# Patient Record
Sex: FEMALE | Race: WHITE | NOT HISPANIC OR LATINO | Employment: UNEMPLOYED | URBAN - METROPOLITAN AREA
[De-identification: names, ages, dates, MRNs, and addresses within clinical notes are randomized per-mention and may not be internally consistent; named-entity substitution may affect disease eponyms.]

---

## 2023-06-29 LAB — URINE CULTURE: NORMAL

## 2023-07-10 LAB
ABO GROUP (TYPE) IN BLOOD: NORMAL
ANTIBODY SCREEN: NORMAL
RH FACTOR: NORMAL

## 2023-11-03 PROBLEM — I10 CHRONIC HYPERTENSION: Status: ACTIVE | Noted: 2023-11-03

## 2023-11-03 PROBLEM — N91.2 AMENORRHEA: Status: ACTIVE | Noted: 2023-11-03

## 2023-11-03 PROBLEM — O13.9 PREGNANCY-INDUCED HYPERTENSION (HHS-HCC): Status: ACTIVE | Noted: 2023-11-03

## 2023-11-03 PROBLEM — O03.9 COMPLETE ABORTION (HHS-HCC): Status: ACTIVE | Noted: 2023-11-03

## 2023-11-03 PROBLEM — O03.9 MISCARRIAGE (HHS-HCC): Status: ACTIVE | Noted: 2023-11-03

## 2023-11-03 PROBLEM — E66.01 OBESITY, MORBID, BMI 40.0-49.9 (MULTI): Status: ACTIVE | Noted: 2023-11-03

## 2023-11-03 RX ORDER — PRENATAL VIT,CAL 74/IRON/FOLIC 27 MG-1 MG
1 TABLET ORAL DAILY
COMMUNITY
Start: 2020-11-24 | End: 2024-05-16 | Stop reason: HOSPADM

## 2023-11-03 RX ORDER — LABETALOL 200 MG/1
1 TABLET, FILM COATED ORAL 2 TIMES DAILY
COMMUNITY
Start: 2021-06-14 | End: 2023-11-06 | Stop reason: WASHOUT

## 2023-11-03 RX ORDER — MISOPROSTOL 200 UG/1
TABLET ORAL
COMMUNITY
Start: 2023-02-15 | End: 2023-11-06 | Stop reason: WASHOUT

## 2023-11-03 RX ORDER — SYRINGE WITH NEEDLE, INSULIN
SYRINGE, EMPTY DISPOSABLE MISCELLANEOUS
COMMUNITY
End: 2023-11-06 | Stop reason: WASHOUT

## 2023-11-06 ENCOUNTER — INITIAL PRENATAL (OUTPATIENT)
Dept: OBSTETRICS AND GYNECOLOGY | Facility: CLINIC | Age: 28
End: 2023-11-06
Payer: COMMERCIAL

## 2023-11-06 VITALS — BODY MASS INDEX: 47.63 KG/M2 | DIASTOLIC BLOOD PRESSURE: 84 MMHG | SYSTOLIC BLOOD PRESSURE: 130 MMHG | WEIGHT: 293 LBS

## 2023-11-06 DIAGNOSIS — Z34.80 SUPERVISION OF OTHER NORMAL PREGNANCY, ANTEPARTUM (HHS-HCC): ICD-10-CM

## 2023-11-06 DIAGNOSIS — Z34.90 PRENATAL CARE, ANTEPARTUM (HHS-HCC): Primary | ICD-10-CM

## 2023-11-06 DIAGNOSIS — I10 CHRONIC HYPERTENSION: ICD-10-CM

## 2023-11-06 DIAGNOSIS — E66.01 OBESITY, MORBID, BMI 40.0-49.9 (MULTI): ICD-10-CM

## 2023-11-06 PROBLEM — N91.2 AMENORRHEA: Status: RESOLVED | Noted: 2023-11-03 | Resolved: 2023-11-06

## 2023-11-06 PROBLEM — O03.9 MISCARRIAGE (HHS-HCC): Status: RESOLVED | Noted: 2023-11-03 | Resolved: 2023-11-06

## 2023-11-06 PROBLEM — O03.9 COMPLETE ABORTION (HHS-HCC): Status: RESOLVED | Noted: 2023-11-03 | Resolved: 2023-11-06

## 2023-11-06 PROBLEM — O13.9 PREGNANCY-INDUCED HYPERTENSION (HHS-HCC): Status: RESOLVED | Noted: 2023-11-03 | Resolved: 2023-11-06

## 2023-11-06 PROCEDURE — 90686 IIV4 VACC NO PRSV 0.5 ML IM: CPT | Performed by: OBSTETRICS & GYNECOLOGY

## 2023-11-06 PROCEDURE — 0500F INITIAL PRENATAL CARE VISIT: CPT | Performed by: OBSTETRICS & GYNECOLOGY

## 2023-11-06 PROCEDURE — 90471 IMMUNIZATION ADMIN: CPT | Performed by: OBSTETRICS & GYNECOLOGY

## 2023-11-06 PROCEDURE — 87800 DETECT AGNT MULT DNA DIREC: CPT

## 2023-11-06 PROCEDURE — 87086 URINE CULTURE/COLONY COUNT: CPT

## 2023-11-07 LAB
BACTERIA UR CULT: NORMAL
C TRACH RRNA SPEC QL NAA+PROBE: NEGATIVE
N GONORRHOEA DNA SPEC QL PROBE+SIG AMP: NEGATIVE

## 2023-11-07 NOTE — PROGRESS NOTES
Subjective   Patient ID [id number]   Melania You is a 27 y.o.  at 8w4d with a working estimated date of delivery of 2024, by Last Menstrual Period who presents for an initial prenatal visit. This pregnancy is planned. She is taking prenatal vitamins.  H/o 34 wk vaginal delivery after IOL at [facility] for siPEC.    Her pregnancy is complicated by:  cHTN: not on meds, multiple mild range bps outside of pregnancy in allscripts  H/o siPEC: delivered at 34.5 wks at [facility] after IOL, vaginal delivery c/b PPH (no transfusion)  H/o GDM  Obesity, starting BMI 47    OB History    Para Term  AB Living   4 1 0 1 2 1   SAB IAB Ectopic Multiple Live Births   2 0 0 0 1      # Outcome Date GA Lbr Jaspreet/2nd Weight Sex Delivery Anes PTL Lv   4 Current            3  21   2778 g M   Y MARIAM   2 SAB  7w0d          1 SAB  7w0d                 Objective   Physical Exam  Weight: 138 kg (304 lb 2 oz)  Expected Total Weight Gain: Could not be calculated   Pregravid BMI: Could not be calculated  BP: 130/84    OBGyn Exam  Gen in NAD  TVUS with single IUP with +FCA c/w LMP, ASHLEY 24    Prenatal Labs  Pending n/v    Assessment/Plan   28 y/o  at 8.4 wks by sure LMP c/w FTUS today presenting for new ob visit.   -cHTN, not on meds: plan serial growth, baseline labs  -h/o siPEC: plan baseline labs at n/v, start ASA at 12 wks  -obesity: NSTs at 34 wks, need to discuss [facility] delivery if BMI >50  -h/o GDM: plan early A1c  -desires NIPS, will draw at n/v  -order given to schedule NT scan  -flu shot given today, discussed covid booster is strongly recommended, she will get  -RTC 4 wks, OB labs, NIPS, baseline HELLP labs, A1c to be drawn then, start ASA    Marta Carmona MD

## 2023-11-17 ENCOUNTER — TELEPHONE (OUTPATIENT)
Dept: OBSTETRICS AND GYNECOLOGY | Facility: CLINIC | Age: 28
End: 2023-11-17

## 2023-11-17 NOTE — TELEPHONE ENCOUNTER
10.1 wk ob called ob line c/o pink spotting.  Patient denies cramping or pain.  Patient admits to having intercourse Wednesday and Thursday night, spotting started this morning.  Patient assured that spotting after intercourse is not uncommon.  Spotting can occur during, directly after or even 1-2 days following intercourse and can last an hour or a few days.  Patient advised to monitor spotting for now, it should eventually turn brown and stop all together.  Patient advised to call if she starts bleeding like a period or with intense cramping or pain.  Patient voiced understanding, no further questions

## 2023-12-01 ENCOUNTER — ROUTINE PRENATAL (OUTPATIENT)
Dept: OBSTETRICS AND GYNECOLOGY | Facility: CLINIC | Age: 28
End: 2023-12-01
Payer: COMMERCIAL

## 2023-12-01 VITALS — BODY MASS INDEX: 48.75 KG/M2 | DIASTOLIC BLOOD PRESSURE: 78 MMHG | WEIGHT: 293 LBS | SYSTOLIC BLOOD PRESSURE: 130 MMHG

## 2023-12-01 DIAGNOSIS — Z3A.12 12 WEEKS GESTATION OF PREGNANCY (HHS-HCC): Primary | ICD-10-CM

## 2023-12-01 DIAGNOSIS — E66.01 OBESITY, MORBID, BMI 40.0-49.9 (MULTI): ICD-10-CM

## 2023-12-01 DIAGNOSIS — I10 CHRONIC HYPERTENSION: ICD-10-CM

## 2023-12-01 LAB
ABO GROUP (TYPE) IN BLOOD: NORMAL
ALBUMIN SERPL BCP-MCNC: 4.1 G/DL (ref 3.4–5)
ALP SERPL-CCNC: 50 U/L (ref 33–110)
ALT SERPL W P-5'-P-CCNC: 18 U/L (ref 7–45)
ANION GAP SERPL CALC-SCNC: 14 MMOL/L (ref 10–20)
ANTIBODY SCREEN: NORMAL
AST SERPL W P-5'-P-CCNC: 10 U/L (ref 9–39)
BILIRUB SERPL-MCNC: 0.3 MG/DL (ref 0–1.2)
BUN SERPL-MCNC: 10 MG/DL (ref 6–23)
CALCIUM SERPL-MCNC: 8.9 MG/DL (ref 8.6–10.3)
CHLORIDE SERPL-SCNC: 104 MMOL/L (ref 98–107)
CO2 SERPL-SCNC: 22 MMOL/L (ref 21–32)
CREAT SERPL-MCNC: 0.43 MG/DL (ref 0.5–1.05)
CREAT UR-MCNC: 30.2 MG/DL (ref 20–320)
ERYTHROCYTE [DISTWIDTH] IN BLOOD BY AUTOMATED COUNT: 13.1 % (ref 11.5–14.5)
GFR SERPL CREATININE-BSD FRML MDRD: >90 ML/MIN/1.73M*2
GLUCOSE SERPL-MCNC: 90 MG/DL (ref 74–99)
HBV SURFACE AG SERPL QL IA: NONREACTIVE
HCT VFR BLD AUTO: 38.9 % (ref 36–46)
HCV AB SER QL: NONREACTIVE
HGB BLD-MCNC: 13.4 G/DL (ref 12–16)
HIV 1+2 AB+HIV1 P24 AG SERPL QL IA: NONREACTIVE
LDH SERPL L TO P-CCNC: 169 U/L (ref 84–246)
MCH RBC QN AUTO: 31.3 PG (ref 26–34)
MCHC RBC AUTO-ENTMCNC: 34.4 G/DL (ref 32–36)
MCV RBC AUTO: 91 FL (ref 80–100)
NRBC BLD-RTO: 0 /100 WBCS (ref 0–0)
PLATELET # BLD AUTO: 295 X10*3/UL (ref 150–450)
POTASSIUM SERPL-SCNC: 3.7 MMOL/L (ref 3.5–5.3)
PROT SERPL-MCNC: 6.9 G/DL (ref 6.4–8.2)
PROT UR-ACNC: 5 MG/DL (ref 5–24)
PROT/CREAT UR: 0.17 MG/MG CREAT (ref 0–0.17)
RBC # BLD AUTO: 4.28 X10*6/UL (ref 4–5.2)
RH FACTOR (ANTIGEN D): NORMAL
SODIUM SERPL-SCNC: 136 MMOL/L (ref 136–145)
T PALLIDUM AB SER QL: NONREACTIVE
WBC # BLD AUTO: 11.6 X10*3/UL (ref 4.4–11.3)

## 2023-12-01 PROCEDURE — 82570 ASSAY OF URINE CREATININE: CPT

## 2023-12-01 PROCEDURE — 83615 LACTATE (LD) (LDH) ENZYME: CPT

## 2023-12-01 PROCEDURE — 0501F PRENATAL FLOW SHEET: CPT

## 2023-12-01 PROCEDURE — 86901 BLOOD TYPING SEROLOGIC RH(D): CPT

## 2023-12-01 PROCEDURE — 83036 HEMOGLOBIN GLYCOSYLATED A1C: CPT

## 2023-12-01 PROCEDURE — 88175 CYTOPATH C/V AUTO FLUID REDO: CPT

## 2023-12-01 PROCEDURE — 86317 IMMUNOASSAY INFECTIOUS AGENT: CPT

## 2023-12-01 PROCEDURE — 86900 BLOOD TYPING SEROLOGIC ABO: CPT

## 2023-12-01 PROCEDURE — 36415 COLL VENOUS BLD VENIPUNCTURE: CPT

## 2023-12-01 PROCEDURE — 80053 COMPREHEN METABOLIC PANEL: CPT

## 2023-12-01 PROCEDURE — 87624 HPV HI-RISK TYP POOLED RSLT: CPT

## 2023-12-01 PROCEDURE — 86803 HEPATITIS C AB TEST: CPT

## 2023-12-01 PROCEDURE — 85027 COMPLETE CBC AUTOMATED: CPT

## 2023-12-01 PROCEDURE — 87340 HEPATITIS B SURFACE AG IA: CPT

## 2023-12-01 PROCEDURE — 86780 TREPONEMA PALLIDUM: CPT

## 2023-12-01 PROCEDURE — 87389 HIV-1 AG W/HIV-1&-2 AB AG IA: CPT

## 2023-12-01 PROCEDURE — 84156 ASSAY OF PROTEIN URINE: CPT

## 2023-12-01 PROCEDURE — 86850 RBC ANTIBODY SCREEN: CPT

## 2023-12-01 NOTE — PROGRESS NOTES
Subjective   Melania You is a 28 y.o.  at 12w1d with a working estimated date of delivery of 2024, by Last Menstrual Period who presents for a routine prenatal visit. She denies vaginal bleeding. Patient reports that nausea is improving. Patient reports good compliance with PNV though has not started taking ASA.     Her pregnancy is complicated by:  Medical Problems       Problem List       Chronic hypertension    Overview Addendum 2023  9:36 PM by Marta Carmona MD     -mild range bps outside of pregnancy reviewed in allscripts         Obesity, morbid, BMI 40.0-49.9 (CMS/[facility])    Overview Signed 2023  9:26 PM by Marta Carmona MD     -starting BMI 47  -need to [place] [facility] [place] >50         Pre-eclampsia, severe, delivered    Overview Addendum 2023  9:29 PM by Marta Carmona MD     -IOL at 34.5 wks for siPEC [place] [facility]  -start ASA at 12 wks, check baseline labs at n/v         Supervision of other normal pregnancy, antepartum    Overview Signed 2023  9:27 PM by Marta Carmona MD     -s/p flu vaccine   -will get COVID booster  -NIPS at n/v along with prenatal labs, A1c, baseline preeclampsia labs               Objective   Physical Exam  Weight: 141 kg (311 lb 4 oz), Pregravid BMI: 47.62  TWG: 3.232 kg (7 lb 2 oz)   Expected Total Weight Gain: 5 kg (11 lb)-9 kg (19 lb)   BP: 130/78           Lab Results   Component Value Date    HGB 13.6 2023    HCT 41.6 2023     2023    ABO CANCELED 2023    LABRH CANCELED 2023    ABID ANTI-D ACQUIRED 2021    NEISSGONOAMP Negative 2023    CHLAMTRACAMP Negative 2023    SYPHT NONREACTIVE 2021    HEPBSAG NONREACTIVE 2020    HIV1X2 NONREACTIVE 2020    URINECULTURE No significant growth 2023           Assessment/Plan   All new OB labs, Pre-e, hgba1c, and NIPS today- patient does want to know gender  Begin ASA prophylaxis today  Patient will obtain COVID booster  prior to next visit  Review NT scan 12/6/23  PE and pap today, no hx of abnormals if current pap is normal repeat in 3 years  Reviewed warning signs and when to call provider  Follow up in 4 weeks for a routine prenatal visit.    KEELY Austin-MELINA

## 2023-12-02 LAB
EST. AVERAGE GLUCOSE BLD GHB EST-MCNC: 108 MG/DL
HBA1C MFR BLD: 5.4 %
REFLEX ADDED, ANEMIA PANEL: NORMAL
RUBV IGG SERPL IA-ACNC: 1.4 IA
RUBV IGG SERPL QL IA: POSITIVE

## 2023-12-06 ENCOUNTER — ANCILLARY PROCEDURE (OUTPATIENT)
Dept: RADIOLOGY | Facility: CLINIC | Age: 28
End: 2023-12-06
Payer: COMMERCIAL

## 2023-12-06 DIAGNOSIS — Z34.90 PRENATAL CARE, ANTEPARTUM (HHS-HCC): ICD-10-CM

## 2023-12-06 DIAGNOSIS — O99.210 OBESITY AFFECTING PREGNANCY (HHS-HCC): ICD-10-CM

## 2023-12-06 PROCEDURE — 76813 OB US NUCHAL MEAS 1 GEST: CPT

## 2023-12-06 PROCEDURE — 76813 OB US NUCHAL MEAS 1 GEST: CPT | Performed by: OBSTETRICS & GYNECOLOGY

## 2023-12-06 PROCEDURE — 76801 OB US < 14 WKS SINGLE FETUS: CPT

## 2023-12-16 LAB
CYTOLOGY CMNT CVX/VAG CYTO-IMP: NORMAL
HPV HR GENOTYPES PNL CVX NAA+PROBE: NEGATIVE
LAB AP HPV GENOTYPE QUESTION: NO
LAB AP HPV HR: NORMAL
LABORATORY COMMENT REPORT: NORMAL
PATH REPORT.TOTAL CANCER: NORMAL

## 2024-01-02 ENCOUNTER — ROUTINE PRENATAL (OUTPATIENT)
Dept: OBSTETRICS AND GYNECOLOGY | Facility: CLINIC | Age: 29
End: 2024-01-02
Payer: COMMERCIAL

## 2024-01-02 VITALS — BODY MASS INDEX: 49.55 KG/M2 | SYSTOLIC BLOOD PRESSURE: 124 MMHG | DIASTOLIC BLOOD PRESSURE: 68 MMHG | WEIGHT: 293 LBS

## 2024-01-02 DIAGNOSIS — Z3A.16 16 WEEKS GESTATION OF PREGNANCY (HHS-HCC): Primary | ICD-10-CM

## 2024-01-02 DIAGNOSIS — Z34.80 SUPERVISION OF OTHER NORMAL PREGNANCY, ANTEPARTUM (HHS-HCC): ICD-10-CM

## 2024-01-02 PROCEDURE — 0501F PRENATAL FLOW SHEET: CPT | Performed by: OBSTETRICS & GYNECOLOGY

## 2024-01-02 NOTE — PROGRESS NOTES
Routine ob at 16.5 wks.  Feeling well. Risk reducing NIPS, its a girl and normal NT scan.  Has anatomy scan scheduled.  Normal ob labs and baseline HELLP labs as well as normal baseline A1c.  RTC 4 wks.

## 2024-01-31 ENCOUNTER — APPOINTMENT (OUTPATIENT)
Dept: OBSTETRICS AND GYNECOLOGY | Facility: CLINIC | Age: 29
End: 2024-01-31
Payer: COMMERCIAL

## 2024-02-01 ENCOUNTER — HOSPITAL ENCOUNTER (OUTPATIENT)
Dept: RADIOLOGY | Facility: CLINIC | Age: 29
Discharge: HOME | End: 2024-02-01
Payer: COMMERCIAL

## 2024-02-01 DIAGNOSIS — Z34.90 PRENATAL CARE, ANTEPARTUM (HHS-HCC): ICD-10-CM

## 2024-02-01 PROCEDURE — 76811 OB US DETAILED SNGL FETUS: CPT | Performed by: STUDENT IN AN ORGANIZED HEALTH CARE EDUCATION/TRAINING PROGRAM

## 2024-02-01 PROCEDURE — 76811 OB US DETAILED SNGL FETUS: CPT

## 2024-02-06 ENCOUNTER — ROUTINE PRENATAL (OUTPATIENT)
Dept: OBSTETRICS AND GYNECOLOGY | Facility: CLINIC | Age: 29
End: 2024-02-06
Payer: COMMERCIAL

## 2024-02-06 VITALS — SYSTOLIC BLOOD PRESSURE: 128 MMHG | BODY MASS INDEX: 49.98 KG/M2 | WEIGHT: 293 LBS | DIASTOLIC BLOOD PRESSURE: 88 MMHG

## 2024-02-06 DIAGNOSIS — Z34.80 SUPERVISION OF OTHER NORMAL PREGNANCY, ANTEPARTUM (HHS-HCC): ICD-10-CM

## 2024-02-06 DIAGNOSIS — Z3A.21 21 WEEKS GESTATION OF PREGNANCY (HHS-HCC): Primary | ICD-10-CM

## 2024-02-06 PROCEDURE — 0501F PRENATAL FLOW SHEET: CPT | Performed by: OBSTETRICS & GYNECOLOGY

## 2024-02-06 NOTE — PROGRESS NOTES
Routine ob at 21.5 wks.  Anatomy scan with missing views, has follow up views scheduled.   First bp mild range, repeat normal, patient with cHTN not on meds.  Good FM.  1 hour n/v.  RTC 4 wks.

## 2024-02-15 ENCOUNTER — HOSPITAL ENCOUNTER (OUTPATIENT)
Dept: RADIOLOGY | Facility: CLINIC | Age: 29
Discharge: HOME | End: 2024-02-15
Payer: COMMERCIAL

## 2024-02-15 DIAGNOSIS — Z34.90 PRENATAL CARE, ANTEPARTUM (HHS-HCC): ICD-10-CM

## 2024-02-15 PROCEDURE — 76816 OB US FOLLOW-UP PER FETUS: CPT

## 2024-02-15 PROCEDURE — 76816 OB US FOLLOW-UP PER FETUS: CPT | Performed by: OBSTETRICS & GYNECOLOGY

## 2024-03-05 ENCOUNTER — ROUTINE PRENATAL (OUTPATIENT)
Dept: OBSTETRICS AND GYNECOLOGY | Facility: CLINIC | Age: 29
End: 2024-03-05
Payer: COMMERCIAL

## 2024-03-05 VITALS — DIASTOLIC BLOOD PRESSURE: 82 MMHG | WEIGHT: 293 LBS | SYSTOLIC BLOOD PRESSURE: 122 MMHG | BODY MASS INDEX: 51.06 KG/M2

## 2024-03-05 DIAGNOSIS — Z3A.25 25 WEEKS GESTATION OF PREGNANCY (HHS-HCC): Primary | ICD-10-CM

## 2024-03-05 DIAGNOSIS — Z13.1 SCREENING FOR DIABETES MELLITUS: ICD-10-CM

## 2024-03-05 DIAGNOSIS — Z34.80 SUPERVISION OF OTHER NORMAL PREGNANCY, ANTEPARTUM (HHS-HCC): ICD-10-CM

## 2024-03-05 LAB
ABO GROUP (TYPE) IN BLOOD: NORMAL
ANTIBODY SCREEN: NORMAL
ERYTHROCYTE [DISTWIDTH] IN BLOOD BY AUTOMATED COUNT: 14 % (ref 11.5–14.5)
GLUCOSE 1H P 50 G GLC PO SERPL-MCNC: 133 MG/DL
HCT VFR BLD AUTO: 35.9 % (ref 36–46)
HGB BLD-MCNC: 11.8 G/DL (ref 12–16)
MCH RBC QN AUTO: 31.8 PG (ref 26–34)
MCHC RBC AUTO-ENTMCNC: 32.9 G/DL (ref 32–36)
MCV RBC AUTO: 97 FL (ref 80–100)
NRBC BLD-RTO: 0 /100 WBCS (ref 0–0)
PLATELET # BLD AUTO: 238 X10*3/UL (ref 150–450)
RBC # BLD AUTO: 3.71 X10*6/UL (ref 4–5.2)
REFLEX ADDED, ANEMIA PANEL: NORMAL
RH FACTOR (ANTIGEN D): NORMAL
WBC # BLD AUTO: 12.1 X10*3/UL (ref 4.4–11.3)

## 2024-03-05 PROCEDURE — 82947 ASSAY GLUCOSE BLOOD QUANT: CPT

## 2024-03-05 PROCEDURE — 86850 RBC ANTIBODY SCREEN: CPT

## 2024-03-05 PROCEDURE — 0501F PRENATAL FLOW SHEET: CPT | Performed by: OBSTETRICS & GYNECOLOGY

## 2024-03-05 PROCEDURE — 36415 COLL VENOUS BLD VENIPUNCTURE: CPT

## 2024-03-05 PROCEDURE — 86901 BLOOD TYPING SEROLOGIC RH(D): CPT

## 2024-03-05 PROCEDURE — 86900 BLOOD TYPING SEROLOGIC ABO: CPT

## 2024-03-05 PROCEDURE — 85027 COMPLETE CBC AUTOMATED: CPT

## 2024-03-05 NOTE — PROGRESS NOTES
Routine ob at 25.5 wks with cHTN not on meds.  Overall doing well.  Normal anatomy scan, has next growth scan scheduled.  BMI now >50, aware [place] will [place] at [facility]. 1 hour today.  RTC 2 wks for rhogam/tdap.

## 2024-03-19 ENCOUNTER — OFFICE VISIT (OUTPATIENT)
Dept: OBSTETRICS AND GYNECOLOGY | Facility: CLINIC | Age: 29
End: 2024-03-19
Payer: COMMERCIAL

## 2024-03-19 VITALS — SYSTOLIC BLOOD PRESSURE: 124 MMHG | WEIGHT: 293 LBS | DIASTOLIC BLOOD PRESSURE: 70 MMHG | BODY MASS INDEX: 50.51 KG/M2

## 2024-03-19 DIAGNOSIS — Z23 NEED FOR DIPHTHERIA-TETANUS-PERTUSSIS (TDAP) VACCINE: ICD-10-CM

## 2024-03-19 DIAGNOSIS — Z29.13 NEED FOR RHOGAM DUE TO RH NEGATIVE MOTHER: ICD-10-CM

## 2024-03-19 PROCEDURE — 90715 TDAP VACCINE 7 YRS/> IM: CPT | Performed by: OBSTETRICS & GYNECOLOGY

## 2024-03-19 PROCEDURE — 1036F TOBACCO NON-USER: CPT | Performed by: OBSTETRICS & GYNECOLOGY

## 2024-03-19 PROCEDURE — 3008F BODY MASS INDEX DOCD: CPT | Performed by: OBSTETRICS & GYNECOLOGY

## 2024-03-19 PROCEDURE — 90471 IMMUNIZATION ADMIN: CPT | Performed by: OBSTETRICS & GYNECOLOGY

## 2024-03-19 PROCEDURE — 96372 THER/PROPH/DIAG INJ SC/IM: CPT | Performed by: OBSTETRICS & GYNECOLOGY

## 2024-03-19 PROCEDURE — 3074F SYST BP LT 130 MM HG: CPT | Performed by: OBSTETRICS & GYNECOLOGY

## 2024-03-19 PROCEDURE — 3078F DIAST BP <80 MM HG: CPT | Performed by: OBSTETRICS & GYNECOLOGY

## 2024-03-19 NOTE — PROGRESS NOTES
Pt here for Tdap and Rhogam    Tdap  Lot   Exp 6/20/2026  Left deltoid    Rhogam  Lot [id number]  Exp 6/24/2026  Left glute    Patient tolerated injections well. Patient had no questions or concerns.      Sheyla Newell MA

## 2024-04-04 ENCOUNTER — HOSPITAL ENCOUNTER (OUTPATIENT)
Dept: RADIOLOGY | Facility: CLINIC | Age: 29
Discharge: HOME | End: 2024-04-04
Payer: COMMERCIAL

## 2024-04-04 DIAGNOSIS — Z03.74 ENCOUNTER FOR SUSPECTED PROBLEM WITH FETAL GROWTH RULED OUT: ICD-10-CM

## 2024-04-04 DIAGNOSIS — Z34.90 PRENATAL CARE, ANTEPARTUM (HHS-HCC): ICD-10-CM

## 2024-04-04 PROCEDURE — 76816 OB US FOLLOW-UP PER FETUS: CPT

## 2024-04-04 PROCEDURE — 76819 FETAL BIOPHYS PROFIL W/O NST: CPT

## 2024-04-04 PROCEDURE — 76816 OB US FOLLOW-UP PER FETUS: CPT | Performed by: OBSTETRICS & GYNECOLOGY

## 2024-04-04 PROCEDURE — 76819 FETAL BIOPHYS PROFIL W/O NST: CPT | Performed by: OBSTETRICS & GYNECOLOGY

## 2024-04-05 ENCOUNTER — ROUTINE PRENATAL (OUTPATIENT)
Dept: OBSTETRICS AND GYNECOLOGY | Facility: CLINIC | Age: 29
End: 2024-04-05
Payer: COMMERCIAL

## 2024-04-05 VITALS — SYSTOLIC BLOOD PRESSURE: 124 MMHG | BODY MASS INDEX: 50.92 KG/M2 | DIASTOLIC BLOOD PRESSURE: 84 MMHG | WEIGHT: 293 LBS

## 2024-04-05 DIAGNOSIS — Z3A.30 30 WEEKS GESTATION OF PREGNANCY (HHS-HCC): Primary | ICD-10-CM

## 2024-04-05 DIAGNOSIS — Z34.80 SUPERVISION OF OTHER NORMAL PREGNANCY, ANTEPARTUM (HHS-HCC): ICD-10-CM

## 2024-04-05 PROCEDURE — 0501F PRENATAL FLOW SHEET: CPT | Performed by: OBSTETRICS & GYNECOLOGY

## 2024-04-05 NOTE — PROGRESS NOTES
Routine ob at 30.1 wks with cHTN not on meds.  Overall feeling well.  Good FM. Growth scan yesterday with EFW >99% (normal 1 hour), has repeat scheduled in 3 wks.  Got rhogam and tdap at nurse visit 3/19/2024.  Aware of NSTs at 34 wks for BMI.  RTC 2 wks.

## 2024-04-19 ENCOUNTER — ROUTINE PRENATAL (OUTPATIENT)
Dept: OBSTETRICS AND GYNECOLOGY | Facility: CLINIC | Age: 29
End: 2024-04-19
Payer: COMMERCIAL

## 2024-04-19 VITALS — SYSTOLIC BLOOD PRESSURE: 138 MMHG | BODY MASS INDEX: 51.62 KG/M2 | DIASTOLIC BLOOD PRESSURE: 86 MMHG | WEIGHT: 293 LBS

## 2024-04-19 DIAGNOSIS — Z3A.32 32 WEEKS GESTATION OF PREGNANCY (HHS-HCC): ICD-10-CM

## 2024-04-19 DIAGNOSIS — O09.523 MULTIGRAVIDA OF ADVANCED MATERNAL AGE IN THIRD TRIMESTER (HHS-HCC): ICD-10-CM

## 2024-04-19 DIAGNOSIS — O36.63X1 EXCESSIVE FETAL GROWTH AFFECTING MANAGEMENT OF PREGNANCY IN THIRD TRIMESTER, FETUS 1 OF MULTIPLE GESTATION (HHS-HCC): ICD-10-CM

## 2024-04-19 DIAGNOSIS — Z34.80 SUPERVISION OF OTHER NORMAL PREGNANCY, ANTEPARTUM (HHS-HCC): ICD-10-CM

## 2024-04-19 DIAGNOSIS — Z87.59 HISTORY OF GESTATIONAL HYPERTENSION: ICD-10-CM

## 2024-04-19 DIAGNOSIS — E66.01 OBESITY, CLASS III, BMI 40-49.9 (MORBID OBESITY) (MULTI): Primary | ICD-10-CM

## 2024-04-19 PROCEDURE — 0501F PRENATAL FLOW SHEET: CPT | Performed by: MIDWIFE

## 2024-04-19 NOTE — PROGRESS NOTES
S: HUGO. Denies sol/rom. Endorses good fetal movement. Denies s/s pre-e.    O: See flow sheets    A: 29yo  at 32.1 per LMP      H/O HDP      Obesity      Increased interval fetal growth EFW 95%ile      P: Reviewed BP, weight      Reviewed/discussed growth scan --> Next one scheduled      FKC      Discussed preparing for baby girl and preparing big brother!      RTO in 2 weeks and sooner PRN      Next visit: review growth scan

## 2024-04-25 ENCOUNTER — HOSPITAL ENCOUNTER (OUTPATIENT)
Dept: RADIOLOGY | Facility: CLINIC | Age: 29
Discharge: HOME | End: 2024-04-25
Payer: COMMERCIAL

## 2024-04-25 DIAGNOSIS — Z34.90 PRENATAL CARE, ANTEPARTUM (HHS-HCC): ICD-10-CM

## 2024-04-25 DIAGNOSIS — O99.213 OBESITY COMPLICATING PREGNANCY, THIRD TRIMESTER (HHS-HCC): ICD-10-CM

## 2024-04-25 PROCEDURE — 76816 OB US FOLLOW-UP PER FETUS: CPT

## 2024-04-25 PROCEDURE — 76819 FETAL BIOPHYS PROFIL W/O NST: CPT | Performed by: STUDENT IN AN ORGANIZED HEALTH CARE EDUCATION/TRAINING PROGRAM

## 2024-04-25 PROCEDURE — 76819 FETAL BIOPHYS PROFIL W/O NST: CPT

## 2024-04-25 PROCEDURE — 76816 OB US FOLLOW-UP PER FETUS: CPT | Performed by: STUDENT IN AN ORGANIZED HEALTH CARE EDUCATION/TRAINING PROGRAM

## 2024-04-29 ENCOUNTER — ROUTINE PRENATAL (OUTPATIENT)
Dept: OBSTETRICS AND GYNECOLOGY | Facility: CLINIC | Age: 29
End: 2024-04-29
Payer: COMMERCIAL

## 2024-04-29 VITALS — SYSTOLIC BLOOD PRESSURE: 130 MMHG | DIASTOLIC BLOOD PRESSURE: 80 MMHG | BODY MASS INDEX: 51.65 KG/M2 | WEIGHT: 293 LBS

## 2024-04-29 DIAGNOSIS — E66.01 OBESITY, MORBID, BMI 40.0-49.9 (MULTI): ICD-10-CM

## 2024-04-29 DIAGNOSIS — Z3A.33 33 WEEKS GESTATION OF PREGNANCY (HHS-HCC): Primary | ICD-10-CM

## 2024-04-29 DIAGNOSIS — Z34.80 SUPERVISION OF OTHER NORMAL PREGNANCY, ANTEPARTUM (HHS-HCC): ICD-10-CM

## 2024-04-29 PROCEDURE — 0501F PRENATAL FLOW SHEET: CPT | Performed by: STUDENT IN AN ORGANIZED HEALTH CARE EDUCATION/TRAINING PROGRAM

## 2024-04-29 NOTE — PROGRESS NOTES
Subjective   Patient ID [id number]   Melania You is a 28 y.o.  at 33w4d with a working estimated date of delivery of 2024, by Last Menstrual Period who presents for a routine prenatal visit. Good FM, no OB complaints.    Objective   Physical Exam  Vitals:    24 1537   BP: 130/80      Weight: 150 kg (329 lb 12.8 oz), Pregravid BMI: 47.62  Expected Total Weight Gain: 5 kg (11 lb)-9 kg (19 lb)   Fundal height and FHR per prenatal flowsheet    Assessment/Plan     Melania You is a 28 y.o.  at 33w4d with a working estimated date of delivery of 2024, by Last Menstrual Period who presents for a routine prenatal visit.    Reviewed last growth, LGA growth pattern with normal GDM screening. Has follow-up growth scheduled. Discussed weekly NSTs to start at 34wga. Also discussed IOL by ASHLEY for BMI. Has breast pump and pediatrician. Discussed contraception at length, considering IUD. [email or web address] provided. Remainder of plan per problem list as below.     Problem List Items Addressed This Visit       Obesity, morbid, BMI 40.0-49.9 (Multi)    Overview     -starting BMI 47  -NSTs at 34 wks  -BMI 51 at 25 wks, delivery at [facility]         Supervision of other normal pregnancy, antepartum ([facility])    Overview     -s/p flu vaccine   -will get COVID booster  -risk reducing NIPS  -s/p rhogam and Tdap at nurses visit 3/19  -Breast/considering IUD/Dr. Stanley for peds  -EFW 98% with AC >99% on scan , has follow up in 3 wks          Other Visit Diagnoses       33 weeks gestation of pregnancy ([facility])    -  Primary          Follow up in 1 weeks for a routine prenatal visit.    Leticia Engel MD

## 2024-05-01 ENCOUNTER — APPOINTMENT (OUTPATIENT)
Dept: OBSTETRICS AND GYNECOLOGY | Facility: CLINIC | Age: 29
End: 2024-05-01
Payer: COMMERCIAL

## 2024-05-03 ENCOUNTER — APPOINTMENT (OUTPATIENT)
Dept: OBSTETRICS AND GYNECOLOGY | Facility: CLINIC | Age: 29
End: 2024-05-03
Payer: COMMERCIAL

## 2024-05-08 PROBLEM — O36.63X0 EXCESSIVE FETAL GROWTH AFFECTING MANAGEMENT OF PREGNANCY IN THIRD TRIMESTER (HHS-HCC): Status: ACTIVE | Noted: 2024-05-08

## 2024-05-09 ENCOUNTER — ROUTINE PRENATAL (OUTPATIENT)
Dept: OBSTETRICS AND GYNECOLOGY | Facility: CLINIC | Age: 29
End: 2024-05-09
Payer: COMMERCIAL

## 2024-05-09 VITALS — BODY MASS INDEX: 52.63 KG/M2 | DIASTOLIC BLOOD PRESSURE: 84 MMHG | SYSTOLIC BLOOD PRESSURE: 128 MMHG | WEIGHT: 293 LBS

## 2024-05-09 DIAGNOSIS — I10 CHRONIC HYPERTENSION: ICD-10-CM

## 2024-05-09 DIAGNOSIS — E66.01 OBESITY, MORBID, BMI 40.0-49.9 (MULTI): ICD-10-CM

## 2024-05-09 DIAGNOSIS — E66.01 OBESITY, CLASS III, BMI 40-49.9 (MORBID OBESITY) (MULTI): Primary | ICD-10-CM

## 2024-05-09 DIAGNOSIS — Z34.80 SUPERVISION OF OTHER NORMAL PREGNANCY, ANTEPARTUM (HHS-HCC): ICD-10-CM

## 2024-05-09 DIAGNOSIS — O36.63X1 EXCESSIVE FETAL GROWTH AFFECTING MANAGEMENT OF PREGNANCY IN THIRD TRIMESTER, FETUS 1 OF MULTIPLE GESTATION (HHS-HCC): ICD-10-CM

## 2024-05-09 DIAGNOSIS — Z3A.35 35 WEEKS GESTATION OF PREGNANCY (HHS-HCC): ICD-10-CM

## 2024-05-09 PROCEDURE — 59426 ANTEPARTUM CARE ONLY: CPT | Performed by: STUDENT IN AN ORGANIZED HEALTH CARE EDUCATION/TRAINING PROGRAM

## 2024-05-09 PROCEDURE — 59025 FETAL NON-STRESS TEST: CPT | Performed by: STUDENT IN AN ORGANIZED HEALTH CARE EDUCATION/TRAINING PROGRAM

## 2024-05-09 NOTE — PROCEDURES
Melania You, a  at 35w0d with an ASHLEY of 2024, by Last Menstrual Period, was seen at [facility] for a nonstress test.    Non-Stress Test   Baseline Fetal Heart Rate for Non-Stress Test: 130 BPM  Variability in Waveform for Non-Stress Test: Moderate  Accelerations in Non-Stress Test: Yes  Decelerations in Non-Stress Test: None  Contractions in Non-Stress Test: Not present  Acoustic Stimulator for Non-Stress Test: No  Interpretation of Non-Stress Test   Interpretation of Non-Stress Test: Reactive         Leticia Engel MD

## 2024-05-09 NOTE — PROGRESS NOTES
Subjective   Patient ID [id number]   Melania You is a 28 y.o.  at 35w0d with a working estimated date of delivery of 2024, by Last Menstrual Period who presents for a routine prenatal visit. Good FM, no OB complaints.    Objective   Physical Exam  Vitals:    24 1523   BP: 128/84      Weight: (!) 152 kg (336 lb), Pregravid BMI: 47.62  Expected Total Weight Gain: 5 kg (11 lb)-9 kg (19 lb)   Fundal height and FHR per prenatal flowsheet    Assessment/Plan     Melania You is a 28 y.o.  at 35w0d with a working estimated date of delivery of 2024, by Last Menstrual Period who presents for a routine prenatal visit.    NST for class 3 obesity reviewed and personally interpreted by me - reactive. Continue weekly NSTs and serial growths with plan for IOL in 39th week. cHTN no meds, normotensive today. Encouraged to check BPs at home and call if elevated. Discussed GBS next visit. Remainder of plan per problem list as below.     Problem List Items Addressed This Visit       Supervision of other normal pregnancy, antepartum ([facility])    Overview     -s/p flu vaccine   -will get COVID booster  -risk reducing NIPS  -s/p rhogam and Tdap at nurses visit 3/19  -Breast/considering IUD/Dr. Stanley for peds  [ ] Serial growths, NSTs at 34wga, delivery in 39th week (BMI)    Next Visit  [ ] NST  [ ] GBS  [ ] Review home BPs         Obesity, morbid, BMI 40.0-49.9 (Multi)    Overview     -starting BMI 47  -NSTs at 34 wks  -BMI 51 at 25 wks, delivery at [facility]         Excessive fetal growth affecting management of pregnancy in third trimester ([facility])    Overview     -EFW 98% with AC >99% on scan   -Next scan          Chronic hypertension    Overview     -mild range bps outside of pregnancy reviewed in allscripts          Other Visit Diagnoses       Obesity, Class III, BMI 40-49.9 (morbid obesity) (Multi)    -  Primary    Relevant Orders    Fetal nonstress test, once          Follow up in 1  weeks for a routine prenatal visit.    Leticia Engel MD

## 2024-05-11 ENCOUNTER — HOSPITAL ENCOUNTER (EMERGENCY)
Facility: HOSPITAL | Age: 29
Discharge: OTHER INSTITUTION | End: 2024-05-11
Attending: INTERNAL MEDICINE
Payer: COMMERCIAL

## 2024-05-11 ENCOUNTER — HOSPITAL ENCOUNTER (INPATIENT)
Facility: HOSPITAL | Age: 29
LOS: 5 days | Discharge: HOME | End: 2024-05-16
Attending: STUDENT IN AN ORGANIZED HEALTH CARE EDUCATION/TRAINING PROGRAM | Admitting: STUDENT IN AN ORGANIZED HEALTH CARE EDUCATION/TRAINING PROGRAM
Payer: COMMERCIAL

## 2024-05-11 VITALS
WEIGHT: 293 LBS | TEMPERATURE: 97.2 F | BODY MASS INDEX: 45.99 KG/M2 | SYSTOLIC BLOOD PRESSURE: 146 MMHG | DIASTOLIC BLOOD PRESSURE: 79 MMHG | RESPIRATION RATE: 16 BRPM | OXYGEN SATURATION: 98 % | HEART RATE: 87 BPM | HEIGHT: 67 IN

## 2024-05-11 DIAGNOSIS — O11.9 CHRONIC HYPERTENSION WITH SUPERIMPOSED PREECLAMPSIA (HHS-HCC): ICD-10-CM

## 2024-05-11 DIAGNOSIS — O16.3 HYPERTENSION AFFECTING PREGNANCY IN THIRD TRIMESTER (HHS-HCC): Primary | ICD-10-CM

## 2024-05-11 LAB
ABO GROUP (TYPE) IN BLOOD: NORMAL
ALBUMIN SERPL BCP-MCNC: 3.7 G/DL (ref 3.4–5)
ALP SERPL-CCNC: 94 U/L (ref 33–110)
ALT SERPL W P-5'-P-CCNC: 13 U/L (ref 7–45)
ANION GAP SERPL CALC-SCNC: 17 MMOL/L (ref 10–20)
ANTIBODY SCREEN: NORMAL
APPEARANCE UR: ABNORMAL
AST SERPL W P-5'-P-CCNC: 13 U/L (ref 9–39)
B-HCG SERPL-ACNC: ABNORMAL MIU/ML
BACTERIA #/AREA URNS AUTO: ABNORMAL /HPF
BASOPHILS # BLD AUTO: 0.03 X10*3/UL (ref 0–0.1)
BASOPHILS NFR BLD AUTO: 0.3 %
BILIRUB DIRECT SERPL-MCNC: 0.1 MG/DL (ref 0–0.3)
BILIRUB SERPL-MCNC: 0.5 MG/DL (ref 0–1.2)
BILIRUB UR STRIP.AUTO-MCNC: NEGATIVE MG/DL
BUN SERPL-MCNC: 7 MG/DL (ref 6–23)
CALCIUM SERPL-MCNC: 9 MG/DL (ref 8.6–10.3)
CHLORIDE SERPL-SCNC: 105 MMOL/L (ref 98–107)
CO2 SERPL-SCNC: 18 MMOL/L (ref 21–32)
COLOR UR: YELLOW
CREAT SERPL-MCNC: 0.48 MG/DL (ref 0.5–1.05)
EGFRCR SERPLBLD CKD-EPI 2021: >90 ML/MIN/1.73M*2
EOSINOPHIL # BLD AUTO: 0.18 X10*3/UL (ref 0–0.7)
EOSINOPHIL NFR BLD AUTO: 1.5 %
ERYTHROCYTE [DISTWIDTH] IN BLOOD BY AUTOMATED COUNT: 13.8 % (ref 11.5–14.5)
GLUCOSE SERPL-MCNC: 87 MG/DL (ref 74–99)
GLUCOSE UR STRIP.AUTO-MCNC: NEGATIVE MG/DL
HCT VFR BLD AUTO: 36.8 % (ref 36–46)
HGB BLD-MCNC: 13.1 G/DL (ref 12–16)
HOLD SPECIMEN: NORMAL
HOLD SPECIMEN: NORMAL
IMM GRANULOCYTES # BLD AUTO: 0.1 X10*3/UL (ref 0–0.7)
IMM GRANULOCYTES NFR BLD AUTO: 0.8 % (ref 0–0.9)
KETONES UR STRIP.AUTO-MCNC: NEGATIVE MG/DL
LACTATE SERPL-SCNC: 1.3 MMOL/L (ref 0.4–2)
LEUKOCYTE ESTERASE UR QL STRIP.AUTO: ABNORMAL
LIPASE SERPL-CCNC: 44 U/L (ref 9–82)
LYMPHOCYTES # BLD AUTO: 2.64 X10*3/UL (ref 1.2–4.8)
LYMPHOCYTES NFR BLD AUTO: 22.2 %
MAGNESIUM SERPL-MCNC: 1.79 MG/DL (ref 1.6–2.4)
MCH RBC QN AUTO: 33.2 PG (ref 26–34)
MCHC RBC AUTO-ENTMCNC: 35.6 G/DL (ref 32–36)
MCV RBC AUTO: 93 FL (ref 80–100)
MONOCYTES # BLD AUTO: 0.82 X10*3/UL (ref 0.1–1)
MONOCYTES NFR BLD AUTO: 6.9 %
NEUTROPHILS # BLD AUTO: 8.12 X10*3/UL (ref 1.2–7.7)
NEUTROPHILS NFR BLD AUTO: 68.3 %
NITRITE UR QL STRIP.AUTO: NEGATIVE
NRBC BLD-RTO: 0 /100 WBCS (ref 0–0)
PH UR STRIP.AUTO: 6 [PH]
PLATELET # BLD AUTO: 282 X10*3/UL (ref 150–450)
POTASSIUM SERPL-SCNC: 3.5 MMOL/L (ref 3.5–5.3)
PROT SERPL-MCNC: 7.1 G/DL (ref 6.4–8.2)
PROT UR STRIP.AUTO-MCNC: NEGATIVE MG/DL
RBC # BLD AUTO: 3.95 X10*6/UL (ref 4–5.2)
RBC # UR STRIP.AUTO: ABNORMAL /UL
RBC #/AREA URNS AUTO: >20 /HPF
RH FACTOR (ANTIGEN D): NORMAL
SODIUM SERPL-SCNC: 136 MMOL/L (ref 136–145)
SP GR UR STRIP.AUTO: 1.01
SQUAMOUS #/AREA URNS AUTO: ABNORMAL /HPF
UROBILINOGEN UR STRIP.AUTO-MCNC: <2 MG/DL
WBC # BLD AUTO: 11.9 X10*3/UL (ref 4.4–11.3)
WBC #/AREA URNS AUTO: >50 /HPF

## 2024-05-11 PROCEDURE — 83605 ASSAY OF LACTIC ACID: CPT | Performed by: INTERNAL MEDICINE

## 2024-05-11 PROCEDURE — 84702 CHORIONIC GONADOTROPIN TEST: CPT | Performed by: INTERNAL MEDICINE

## 2024-05-11 PROCEDURE — 87086 URINE CULTURE/COLONY COUNT: CPT | Mod: ELYLAB | Performed by: INTERNAL MEDICINE

## 2024-05-11 PROCEDURE — 86901 BLOOD TYPING SEROLOGIC RH(D): CPT | Performed by: INTERNAL MEDICINE

## 2024-05-11 PROCEDURE — 85025 COMPLETE CBC W/AUTO DIFF WBC: CPT | Performed by: INTERNAL MEDICINE

## 2024-05-11 PROCEDURE — 2500000004 HC RX 250 GENERAL PHARMACY W/ HCPCS (ALT 636 FOR OP/ED): Performed by: INTERNAL MEDICINE

## 2024-05-11 PROCEDURE — 1120000001 HC OB PRIVATE ROOM DAILY

## 2024-05-11 PROCEDURE — 36415 COLL VENOUS BLD VENIPUNCTURE: CPT | Performed by: INTERNAL MEDICINE

## 2024-05-11 PROCEDURE — 99291 CRITICAL CARE FIRST HOUR: CPT | Performed by: INTERNAL MEDICINE

## 2024-05-11 PROCEDURE — 82248 BILIRUBIN DIRECT: CPT | Performed by: INTERNAL MEDICINE

## 2024-05-11 PROCEDURE — 96374 THER/PROPH/DIAG INJ IV PUSH: CPT | Performed by: INTERNAL MEDICINE

## 2024-05-11 PROCEDURE — 83690 ASSAY OF LIPASE: CPT | Performed by: INTERNAL MEDICINE

## 2024-05-11 PROCEDURE — 81001 URINALYSIS AUTO W/SCOPE: CPT | Performed by: INTERNAL MEDICINE

## 2024-05-11 PROCEDURE — 86870 RBC ANTIBODY IDENTIFICATION: CPT

## 2024-05-11 PROCEDURE — 80048 BASIC METABOLIC PNL TOTAL CA: CPT | Performed by: INTERNAL MEDICINE

## 2024-05-11 PROCEDURE — 83735 ASSAY OF MAGNESIUM: CPT | Performed by: INTERNAL MEDICINE

## 2024-05-11 PROCEDURE — 99285 EMERGENCY DEPT VISIT HI MDM: CPT | Mod: 25 | Performed by: INTERNAL MEDICINE

## 2024-05-11 RX ORDER — MAGNESIUM SULFATE HEPTAHYDRATE 40 MG/ML
2 INJECTION, SOLUTION INTRAVENOUS CONTINUOUS
Status: DISCONTINUED | OUTPATIENT
Start: 2024-05-11 | End: 2024-05-11 | Stop reason: HOSPADM

## 2024-05-11 RX ORDER — METHYLERGONOVINE MALEATE 0.2 MG/ML
0.2 INJECTION INTRAVENOUS ONCE AS NEEDED
Status: DISCONTINUED | OUTPATIENT
Start: 2024-05-11 | End: 2024-05-13

## 2024-05-11 RX ORDER — ONDANSETRON HYDROCHLORIDE 2 MG/ML
4 INJECTION, SOLUTION INTRAVENOUS EVERY 6 HOURS PRN
Status: DISCONTINUED | OUTPATIENT
Start: 2024-05-11 | End: 2024-05-13

## 2024-05-11 RX ORDER — OXYTOCIN 10 [USP'U]/ML
10 INJECTION, SOLUTION INTRAMUSCULAR; INTRAVENOUS ONCE AS NEEDED
Status: DISCONTINUED | OUTPATIENT
Start: 2024-05-11 | End: 2024-05-13

## 2024-05-11 RX ORDER — OXYTOCIN/0.9 % SODIUM CHLORIDE 30/500 ML
60 PLASTIC BAG, INJECTION (ML) INTRAVENOUS ONCE AS NEEDED
Status: DISCONTINUED | OUTPATIENT
Start: 2024-05-11 | End: 2024-05-13

## 2024-05-11 RX ORDER — TERBUTALINE SULFATE 1 MG/ML
0.25 INJECTION SUBCUTANEOUS ONCE AS NEEDED
Status: DISCONTINUED | OUTPATIENT
Start: 2024-05-11 | End: 2024-05-13

## 2024-05-11 RX ORDER — ONDANSETRON 4 MG/1
4 TABLET, FILM COATED ORAL EVERY 6 HOURS PRN
Status: DISCONTINUED | OUTPATIENT
Start: 2024-05-11 | End: 2024-05-13

## 2024-05-11 RX ORDER — SODIUM CHLORIDE, SODIUM LACTATE, POTASSIUM CHLORIDE, CALCIUM CHLORIDE 600; 310; 30; 20 MG/100ML; MG/100ML; MG/100ML; MG/100ML
125 INJECTION, SOLUTION INTRAVENOUS CONTINUOUS
Status: DISCONTINUED | OUTPATIENT
Start: 2024-05-12 | End: 2024-05-12

## 2024-05-11 RX ORDER — ACETAMINOPHEN 325 MG/1
975 TABLET ORAL ONCE
Status: COMPLETED | OUTPATIENT
Start: 2024-05-11 | End: 2024-05-11

## 2024-05-11 RX ORDER — LABETALOL HYDROCHLORIDE 5 MG/ML
20 INJECTION, SOLUTION INTRAVENOUS ONCE AS NEEDED
Status: COMPLETED | OUTPATIENT
Start: 2024-05-11 | End: 2024-05-12

## 2024-05-11 RX ORDER — NIFEDIPINE 10 MG/1
10 CAPSULE ORAL ONCE AS NEEDED
Status: DISCONTINUED | OUTPATIENT
Start: 2024-05-11 | End: 2024-05-13

## 2024-05-11 RX ORDER — LOPERAMIDE HYDROCHLORIDE 2 MG/1
4 CAPSULE ORAL EVERY 2 HOUR PRN
Status: DISCONTINUED | OUTPATIENT
Start: 2024-05-11 | End: 2024-05-13

## 2024-05-11 RX ORDER — SODIUM CHLORIDE, SODIUM LACTATE, POTASSIUM CHLORIDE, CALCIUM CHLORIDE 600; 310; 30; 20 MG/100ML; MG/100ML; MG/100ML; MG/100ML
75 INJECTION, SOLUTION INTRAVENOUS CONTINUOUS
Status: DISCONTINUED | OUTPATIENT
Start: 2024-05-12 | End: 2024-05-13

## 2024-05-11 RX ORDER — CALCIUM GLUCONATE 20 MG/ML
1 INJECTION, SOLUTION INTRAVENOUS AS NEEDED
Status: DISCONTINUED | OUTPATIENT
Start: 2024-05-11 | End: 2024-05-16 | Stop reason: HOSPADM

## 2024-05-11 RX ORDER — METOCLOPRAMIDE HYDROCHLORIDE 5 MG/ML
10 INJECTION INTRAMUSCULAR; INTRAVENOUS EVERY 6 HOURS PRN
Status: DISCONTINUED | OUTPATIENT
Start: 2024-05-11 | End: 2024-05-13

## 2024-05-11 RX ORDER — HYDRALAZINE HYDROCHLORIDE 20 MG/ML
5 INJECTION INTRAMUSCULAR; INTRAVENOUS ONCE AS NEEDED
Status: DISCONTINUED | OUTPATIENT
Start: 2024-05-11 | End: 2024-05-13

## 2024-05-11 RX ORDER — MAGNESIUM SULFATE HEPTAHYDRATE 40 MG/ML
2 INJECTION, SOLUTION INTRAVENOUS CONTINUOUS
Status: DISCONTINUED | OUTPATIENT
Start: 2024-05-12 | End: 2024-05-16 | Stop reason: HOSPADM

## 2024-05-11 RX ORDER — MISOPROSTOL 200 UG/1
800 TABLET ORAL ONCE AS NEEDED
Status: COMPLETED | OUTPATIENT
Start: 2024-05-11 | End: 2024-05-13

## 2024-05-11 RX ORDER — LIDOCAINE HYDROCHLORIDE 10 MG/ML
30 INJECTION INFILTRATION; PERINEURAL ONCE AS NEEDED
Status: DISCONTINUED | OUTPATIENT
Start: 2024-05-11 | End: 2024-05-13

## 2024-05-11 RX ORDER — METOCLOPRAMIDE 10 MG/1
10 TABLET ORAL EVERY 6 HOURS PRN
Status: DISCONTINUED | OUTPATIENT
Start: 2024-05-11 | End: 2024-05-12 | Stop reason: SDUPTHER

## 2024-05-11 RX ORDER — TRANEXAMIC ACID 100 MG/ML
1000 INJECTION, SOLUTION INTRAVENOUS ONCE AS NEEDED
Status: DISCONTINUED | OUTPATIENT
Start: 2024-05-11 | End: 2024-05-13

## 2024-05-11 RX ORDER — CARBOPROST TROMETHAMINE 250 UG/ML
250 INJECTION, SOLUTION INTRAMUSCULAR ONCE AS NEEDED
Status: COMPLETED | OUTPATIENT
Start: 2024-05-11 | End: 2024-05-13

## 2024-05-11 RX ORDER — CALCIUM GLUCONATE 20 MG/ML
1 INJECTION, SOLUTION INTRAVENOUS AS NEEDED
Status: DISCONTINUED | OUTPATIENT
Start: 2024-05-11 | End: 2024-05-11 | Stop reason: HOSPADM

## 2024-05-11 RX ADMIN — MAGNESIUM SULFATE HEPTAHYDRATE 2 G/HR: 40 INJECTION, SOLUTION INTRAVENOUS at 21:33

## 2024-05-11 RX ADMIN — ACETAMINOPHEN 975 MG: 325 TABLET ORAL at 21:18

## 2024-05-11 ASSESSMENT — PAIN SCALES - GENERAL
PAINLEVEL_OUTOF10: 0 - NO PAIN
PAINLEVEL_OUTOF10: 4

## 2024-05-11 ASSESSMENT — COLUMBIA-SUICIDE SEVERITY RATING SCALE - C-SSRS
6. HAVE YOU EVER DONE ANYTHING, STARTED TO DO ANYTHING, OR PREPARED TO DO ANYTHING TO END YOUR LIFE?: NO
2. HAVE YOU ACTUALLY HAD ANY THOUGHTS OF KILLING YOURSELF?: NO
1. IN THE PAST MONTH, HAVE YOU WISHED YOU WERE DEAD OR WISHED YOU COULD GO TO SLEEP AND NOT WAKE UP?: NO

## 2024-05-11 ASSESSMENT — PAIN - FUNCTIONAL ASSESSMENT: PAIN_FUNCTIONAL_ASSESSMENT: 0-10

## 2024-05-11 ASSESSMENT — PAIN DESCRIPTION - PROGRESSION: CLINICAL_PROGRESSION: NOT CHANGED

## 2024-05-11 ASSESSMENT — PAIN DESCRIPTION - LOCATION: LOCATION: HEAD

## 2024-05-12 ENCOUNTER — HOSPITAL ENCOUNTER (OUTPATIENT)
Dept: CARDIOLOGY | Facility: HOSPITAL | Age: 29
Discharge: HOME | End: 2024-05-12
Payer: COMMERCIAL

## 2024-05-12 ENCOUNTER — ANESTHESIA (OUTPATIENT)
Dept: OBSTETRICS AND GYNECOLOGY | Facility: HOSPITAL | Age: 29
End: 2024-05-12
Payer: COMMERCIAL

## 2024-05-12 ENCOUNTER — ANESTHESIA EVENT (OUTPATIENT)
Dept: OBSTETRICS AND GYNECOLOGY | Facility: HOSPITAL | Age: 29
End: 2024-05-12
Payer: COMMERCIAL

## 2024-05-12 LAB
ABO GROUP (TYPE) IN BLOOD: NORMAL
ALBUMIN SERPL BCP-MCNC: 3.4 G/DL (ref 3.4–5)
ALP SERPL-CCNC: 90 U/L (ref 33–110)
ALT SERPL W P-5'-P-CCNC: 12 U/L (ref 7–45)
ANION GAP SERPL CALC-SCNC: 19 MMOL/L (ref 10–20)
ANTIBODY SCREEN: NORMAL
AST SERPL W P-5'-P-CCNC: 12 U/L (ref 9–39)
ATRIAL RATE: 99 BPM
BB ANTIBODY IDENTIFICATION: NORMAL
BILIRUB SERPL-MCNC: 0.6 MG/DL (ref 0–1.2)
BUN SERPL-MCNC: 6 MG/DL (ref 6–23)
CALCIUM SERPL-MCNC: 8 MG/DL (ref 8.6–10.6)
CASE #: NORMAL
CHLORIDE SERPL-SCNC: 104 MMOL/L (ref 98–107)
CO2 SERPL-SCNC: 15 MMOL/L (ref 21–32)
CREAT SERPL-MCNC: 0.39 MG/DL (ref 0.5–1.05)
EGFRCR SERPLBLD CKD-EPI 2021: >90 ML/MIN/1.73M*2
ERYTHROCYTE [DISTWIDTH] IN BLOOD BY AUTOMATED COUNT: 14.1 % (ref 11.5–14.5)
GLUCOSE SERPL-MCNC: 88 MG/DL (ref 74–99)
HCT VFR BLD AUTO: 35.6 % (ref 36–46)
HGB BLD-MCNC: 12 G/DL (ref 12–16)
MCH RBC QN AUTO: 31.7 PG (ref 26–34)
MCHC RBC AUTO-ENTMCNC: 33.7 G/DL (ref 32–36)
MCV RBC AUTO: 94 FL (ref 80–100)
NRBC BLD-RTO: 0 /100 WBCS (ref 0–0)
P AXIS: -1 DEGREES
P OFFSET: 194 MS
P ONSET: 149 MS
PLATELET # BLD AUTO: 273 X10*3/UL (ref 150–450)
POTASSIUM SERPL-SCNC: 3.5 MMOL/L (ref 3.5–5.3)
PR INTERVAL: 136 MS
PROT SERPL-MCNC: 6 G/DL (ref 6.4–8.2)
Q ONSET: 217 MS
QRS COUNT: 16 BEATS
QRS DURATION: 80 MS
QT INTERVAL: 360 MS
QTC CALCULATION(BAZETT): 462 MS
QTC FREDERICIA: 425 MS
R AXIS: 21 DEGREES
RBC # BLD AUTO: 3.78 X10*6/UL (ref 4–5.2)
RH FACTOR (ANTIGEN D): NORMAL
SODIUM SERPL-SCNC: 134 MMOL/L (ref 136–145)
T AXIS: 14 DEGREES
T OFFSET: 397 MS
TREPONEMA PALLIDUM IGG+IGM AB [PRESENCE] IN SERUM OR PLASMA BY IMMUNOASSAY: NONREACTIVE
VENTRICULAR RATE: 99 BPM
WBC # BLD AUTO: 12.9 X10*3/UL (ref 4.4–11.3)

## 2024-05-12 PROCEDURE — 2500000004 HC RX 250 GENERAL PHARMACY W/ HCPCS (ALT 636 FOR OP/ED): Mod: JZ

## 2024-05-12 PROCEDURE — 2500000005 HC RX 250 GENERAL PHARMACY W/O HCPCS: Performed by: STUDENT IN AN ORGANIZED HEALTH CARE EDUCATION/TRAINING PROGRAM

## 2024-05-12 PROCEDURE — 3700000014 EPIDURAL BLOCK: Mod: GC

## 2024-05-12 PROCEDURE — 2500000001 HC RX 250 WO HCPCS SELF ADMINISTERED DRUGS (ALT 637 FOR MEDICARE OP)

## 2024-05-12 PROCEDURE — 4A1H7CZ MONITORING OF PRODUCTS OF CONCEPTION, CARDIAC RATE, VIA NATURAL OR ARTIFICIAL OPENING: ICD-10-PCS | Performed by: STUDENT IN AN ORGANIZED HEALTH CARE EDUCATION/TRAINING PROGRAM

## 2024-05-12 PROCEDURE — 10H07YZ INSERTION OF OTHER DEVICE INTO PRODUCTS OF CONCEPTION, VIA NATURAL OR ARTIFICIAL OPENING: ICD-10-PCS | Performed by: STUDENT IN AN ORGANIZED HEALTH CARE EDUCATION/TRAINING PROGRAM

## 2024-05-12 PROCEDURE — 2500000005 HC RX 250 GENERAL PHARMACY W/O HCPCS

## 2024-05-12 PROCEDURE — 2500000004 HC RX 250 GENERAL PHARMACY W/ HCPCS (ALT 636 FOR OP/ED): Performed by: STUDENT IN AN ORGANIZED HEALTH CARE EDUCATION/TRAINING PROGRAM

## 2024-05-12 PROCEDURE — 80053 COMPREHEN METABOLIC PANEL: CPT | Performed by: STUDENT IN AN ORGANIZED HEALTH CARE EDUCATION/TRAINING PROGRAM

## 2024-05-12 PROCEDURE — 86077 PHYS BLOOD BANK SERV XMATCH: CPT | Performed by: STUDENT IN AN ORGANIZED HEALTH CARE EDUCATION/TRAINING PROGRAM

## 2024-05-12 PROCEDURE — 2500000004 HC RX 250 GENERAL PHARMACY W/ HCPCS (ALT 636 FOR OP/ED)

## 2024-05-12 PROCEDURE — 86922 COMPATIBILITY TEST ANTIGLOB: CPT

## 2024-05-12 PROCEDURE — 3700000001 HC GENERAL ANESTHESIA TIME - INITIAL BASE CHARGE: Performed by: ANESTHESIOLOGY

## 2024-05-12 PROCEDURE — 86870 RBC ANTIBODY IDENTIFICATION: CPT

## 2024-05-12 PROCEDURE — 10907ZC DRAINAGE OF AMNIOTIC FLUID, THERAPEUTIC FROM PRODUCTS OF CONCEPTION, VIA NATURAL OR ARTIFICIAL OPENING: ICD-10-PCS | Performed by: STUDENT IN AN ORGANIZED HEALTH CARE EDUCATION/TRAINING PROGRAM

## 2024-05-12 PROCEDURE — 2500000006 HC RX 250 W HCPCS SELF ADMINISTERED DRUGS (ALT 637 FOR ALL PAYERS): Performed by: STUDENT IN AN ORGANIZED HEALTH CARE EDUCATION/TRAINING PROGRAM

## 2024-05-12 PROCEDURE — 87081 CULTURE SCREEN ONLY: CPT

## 2024-05-12 PROCEDURE — 99199 UNLISTED SPECIAL SVC PX/RPRT: CPT

## 2024-05-12 PROCEDURE — 86901 BLOOD TYPING SEROLOGIC RH(D): CPT | Performed by: STUDENT IN AN ORGANIZED HEALTH CARE EDUCATION/TRAINING PROGRAM

## 2024-05-12 PROCEDURE — 3700000002 HC GENERAL ANESTHESIA TIME - EACH INCREMENTAL 1 MINUTE: Performed by: ANESTHESIOLOGY

## 2024-05-12 PROCEDURE — 86780 TREPONEMA PALLIDUM: CPT | Performed by: STUDENT IN AN ORGANIZED HEALTH CARE EDUCATION/TRAINING PROGRAM

## 2024-05-12 PROCEDURE — 36415 COLL VENOUS BLD VENIPUNCTURE: CPT | Performed by: STUDENT IN AN ORGANIZED HEALTH CARE EDUCATION/TRAINING PROGRAM

## 2024-05-12 PROCEDURE — 7210000002 HC LABOR PER HOUR

## 2024-05-12 PROCEDURE — 3E033VJ INTRODUCTION OF OTHER HORMONE INTO PERIPHERAL VEIN, PERCUTANEOUS APPROACH: ICD-10-PCS

## 2024-05-12 PROCEDURE — 1120000001 HC OB PRIVATE ROOM DAILY

## 2024-05-12 PROCEDURE — 93005 ELECTROCARDIOGRAM TRACING: CPT

## 2024-05-12 PROCEDURE — 2500000006 HC RX 250 W HCPCS SELF ADMINISTERED DRUGS (ALT 637 FOR ALL PAYERS)

## 2024-05-12 PROCEDURE — 85027 COMPLETE CBC AUTOMATED: CPT | Performed by: STUDENT IN AN ORGANIZED HEALTH CARE EDUCATION/TRAINING PROGRAM

## 2024-05-12 RX ORDER — METOCLOPRAMIDE 10 MG/1
10 TABLET ORAL EVERY 6 HOURS PRN
Status: DISCONTINUED | OUTPATIENT
Start: 2024-05-12 | End: 2024-05-13

## 2024-05-12 RX ORDER — FENTANYL/BUPIVACAINE/NS/PF 2MCG/ML-.1
PLASTIC BAG, INJECTION (ML) INJECTION AS NEEDED
Status: DISCONTINUED | OUTPATIENT
Start: 2024-05-12 | End: 2024-05-12

## 2024-05-12 RX ORDER — LABETALOL HYDROCHLORIDE 5 MG/ML
INJECTION, SOLUTION INTRAVENOUS
Status: COMPLETED
Start: 2024-05-12 | End: 2024-05-12

## 2024-05-12 RX ORDER — NIFEDIPINE 60 MG/1
60 TABLET, FILM COATED, EXTENDED RELEASE ORAL ONCE
Status: COMPLETED | OUTPATIENT
Start: 2024-05-13 | End: 2024-05-13

## 2024-05-12 RX ORDER — FENTANYL/BUPIVACAINE/NS/PF 2MCG/ML-.1
PLASTIC BAG, INJECTION (ML) INJECTION CONTINUOUS PRN
Status: DISCONTINUED | OUTPATIENT
Start: 2024-05-12 | End: 2024-05-13

## 2024-05-12 RX ORDER — METOCLOPRAMIDE HYDROCHLORIDE 5 MG/ML
10 INJECTION INTRAMUSCULAR; INTRAVENOUS EVERY 6 HOURS PRN
Status: DISCONTINUED | OUTPATIENT
Start: 2024-05-12 | End: 2024-05-13

## 2024-05-12 RX ORDER — CEFAZOLIN SODIUM 1 G/50ML
1 SOLUTION INTRAVENOUS EVERY 8 HOURS
Status: DISCONTINUED | OUTPATIENT
Start: 2024-05-12 | End: 2024-05-13

## 2024-05-12 RX ORDER — LIDOCAINE HCL/EPINEPHRINE/PF 2%-1:200K
VIAL (ML) INJECTION AS NEEDED
Status: DISCONTINUED | OUTPATIENT
Start: 2024-05-12 | End: 2024-05-13

## 2024-05-12 RX ORDER — NIFEDIPINE 30 MG/1
30 TABLET, FILM COATED, EXTENDED RELEASE ORAL EVERY 24 HOURS
Status: DISCONTINUED | OUTPATIENT
Start: 2024-05-12 | End: 2024-05-13

## 2024-05-12 RX ORDER — PHENYLEPHRINE HCL IN 0.9% NACL 0.4MG/10ML
SYRINGE (ML) INTRAVENOUS AS NEEDED
Status: DISCONTINUED | OUTPATIENT
Start: 2024-05-12 | End: 2024-05-13

## 2024-05-12 RX ORDER — DIPHENHYDRAMINE HYDROCHLORIDE 50 MG/ML
25 INJECTION INTRAMUSCULAR; INTRAVENOUS EVERY 6 HOURS PRN
Status: DISCONTINUED | OUTPATIENT
Start: 2024-05-12 | End: 2024-05-13

## 2024-05-12 RX ORDER — OXYTOCIN/0.9 % SODIUM CHLORIDE 30/500 ML
2-30 PLASTIC BAG, INJECTION (ML) INTRAVENOUS CONTINUOUS
Status: DISCONTINUED | OUTPATIENT
Start: 2024-05-12 | End: 2024-05-13

## 2024-05-12 RX ORDER — FENTANYL/BUPIVACAINE/NS/PF 2MCG/ML-.1
PLASTIC BAG, INJECTION (ML) INJECTION AS NEEDED
Status: DISCONTINUED | OUTPATIENT
Start: 2024-05-12 | End: 2024-05-13

## 2024-05-12 RX ORDER — NORETHINDRONE AND ETHINYL ESTRADIOL 0.5-0.035
KIT ORAL AS NEEDED
Status: DISCONTINUED | OUTPATIENT
Start: 2024-05-12 | End: 2024-05-13

## 2024-05-12 RX ORDER — LIDOCAINE 560 MG/1
1 PATCH PERCUTANEOUS; TOPICAL; TRANSDERMAL DAILY
Status: DISCONTINUED | OUTPATIENT
Start: 2024-05-12 | End: 2024-05-13

## 2024-05-12 RX ORDER — LABETALOL HYDROCHLORIDE 5 MG/ML
20 INJECTION, SOLUTION INTRAVENOUS ONCE
Status: COMPLETED | OUTPATIENT
Start: 2024-05-12 | End: 2024-05-12

## 2024-05-12 RX ORDER — CEFAZOLIN SODIUM 2 G/100ML
2 INJECTION, SOLUTION INTRAVENOUS ONCE
Status: COMPLETED | OUTPATIENT
Start: 2024-05-12 | End: 2024-05-12

## 2024-05-12 RX ORDER — ACETAMINOPHEN 325 MG/1
975 TABLET ORAL EVERY 6 HOURS PRN
Status: DISCONTINUED | OUTPATIENT
Start: 2024-05-12 | End: 2024-05-13

## 2024-05-12 RX ORDER — NIFEDIPINE 30 MG/1
30 TABLET, FILM COATED, EXTENDED RELEASE ORAL ONCE
Status: COMPLETED | OUTPATIENT
Start: 2024-05-12 | End: 2024-05-12

## 2024-05-12 RX ORDER — CYCLOBENZAPRINE HCL 10 MG
10 TABLET ORAL ONCE
Status: COMPLETED | OUTPATIENT
Start: 2024-05-12 | End: 2024-05-12

## 2024-05-12 RX ORDER — NIFEDIPINE 30 MG/1
30 TABLET, FILM COATED, EXTENDED RELEASE ORAL
Status: DISCONTINUED | OUTPATIENT
Start: 2024-05-12 | End: 2024-05-12

## 2024-05-12 RX ADMIN — Medication 80 MCG: at 08:37

## 2024-05-12 RX ADMIN — SODIUM CHLORIDE, POTASSIUM CHLORIDE, SODIUM LACTATE AND CALCIUM CHLORIDE 75 ML/HR: 600; 310; 30; 20 INJECTION, SOLUTION INTRAVENOUS at 08:51

## 2024-05-12 RX ADMIN — Medication 14 ML/HR: at 08:14

## 2024-05-12 RX ADMIN — ACETAMINOPHEN 975 MG: 325 TABLET ORAL at 13:30

## 2024-05-12 RX ADMIN — Medication 80 MCG: at 08:33

## 2024-05-12 RX ADMIN — Medication 80 MCG: at 08:51

## 2024-05-12 RX ADMIN — NIFEDIPINE 30 MG: 30 TABLET, FILM COATED, EXTENDED RELEASE ORAL at 06:54

## 2024-05-12 RX ADMIN — MAGNESIUM SULFATE HEPTAHYDRATE 2 G/HR: 40 INJECTION, SOLUTION INTRAVENOUS at 16:01

## 2024-05-12 RX ADMIN — CEFAZOLIN SODIUM 1 G: 1 INJECTION, SOLUTION INTRAVENOUS at 20:59

## 2024-05-12 RX ADMIN — FENTANYL CITRATE 5 ML: 50 INJECTION INTRAMUSCULAR; INTRAVENOUS at 08:11

## 2024-05-12 RX ADMIN — SODIUM CHLORIDE, POTASSIUM CHLORIDE, SODIUM LACTATE AND CALCIUM CHLORIDE 500 ML: 600; 310; 30; 20 INJECTION, SOLUTION INTRAVENOUS at 07:44

## 2024-05-12 RX ADMIN — Medication 2 MILLI-UNITS/MIN: at 03:31

## 2024-05-12 RX ADMIN — DIPHENHYDRAMINE HYDROCHLORIDE 25 MG: 50 INJECTION, SOLUTION INTRAMUSCULAR; INTRAVENOUS at 21:47

## 2024-05-12 RX ADMIN — FENTANYL CITRATE 5 ML: 50 INJECTION INTRAMUSCULAR; INTRAVENOUS at 10:51

## 2024-05-12 RX ADMIN — LABETALOL HYDROCHLORIDE 20 MG: 5 INJECTION, SOLUTION INTRAVENOUS at 00:55

## 2024-05-12 RX ADMIN — SODIUM CHLORIDE, POTASSIUM CHLORIDE, SODIUM LACTATE AND CALCIUM CHLORIDE 125 ML/HR: 600; 310; 30; 20 INJECTION, SOLUTION INTRAVENOUS at 00:08

## 2024-05-12 RX ADMIN — SODIUM CHLORIDE, POTASSIUM CHLORIDE, SODIUM LACTATE AND CALCIUM CHLORIDE 500 ML: 600; 310; 30; 20 INJECTION, SOLUTION INTRAVENOUS at 22:32

## 2024-05-12 RX ADMIN — ONDANSETRON 4 MG: 2 INJECTION INTRAMUSCULAR; INTRAVENOUS at 08:37

## 2024-05-12 RX ADMIN — FENTANYL CITRATE 5 ML: 50 INJECTION INTRAMUSCULAR; INTRAVENOUS at 08:09

## 2024-05-12 RX ADMIN — CEFAZOLIN SODIUM 2 G: 2 INJECTION, SOLUTION INTRAVENOUS at 03:16

## 2024-05-12 RX ADMIN — MAGNESIUM SULFATE HEPTAHYDRATE 2 G/HR: 40 INJECTION, SOLUTION INTRAVENOUS at 05:59

## 2024-05-12 RX ADMIN — EPHEDRINE SULFATE 15 MG: 50 INJECTION, SOLUTION INTRAVENOUS at 09:15

## 2024-05-12 RX ADMIN — LIDOCAINE HYDROCHLORIDE,EPINEPHRINE BITARTRATE 5 ML: 20; .005 INJECTION, SOLUTION EPIDURAL; INFILTRATION; INTRACAUDAL; PERINEURAL at 22:05

## 2024-05-12 RX ADMIN — METOCLOPRAMIDE 10 MG: 5 INJECTION, SOLUTION INTRAMUSCULAR; INTRAVENOUS at 21:46

## 2024-05-12 RX ADMIN — Medication 80 MCG: at 08:42

## 2024-05-12 RX ADMIN — LIDOCAINE 1 PATCH: 4 PATCH TOPICAL at 17:34

## 2024-05-12 RX ADMIN — Medication 2 L/MIN: at 08:38

## 2024-05-12 RX ADMIN — LABETALOL HYDROCHLORIDE 20 MG: 5 INJECTION, SOLUTION INTRAVENOUS at 06:56

## 2024-05-12 RX ADMIN — ACETAMINOPHEN 975 MG: 325 TABLET ORAL at 20:41

## 2024-05-12 RX ADMIN — LABETALOL HYDROCHLORIDE 20 MG: 5 INJECTION, SOLUTION INTRAVENOUS at 22:03

## 2024-05-12 RX ADMIN — MAGNESIUM SULFATE HEPTAHYDRATE 2 G/HR: 40 INJECTION, SOLUTION INTRAVENOUS at 00:07

## 2024-05-12 RX ADMIN — EPHEDRINE SULFATE 10 MG: 50 INJECTION, SOLUTION INTRAVENOUS at 08:56

## 2024-05-12 RX ADMIN — CYCLOBENZAPRINE 10 MG: 10 TABLET, FILM COATED ORAL at 14:56

## 2024-05-12 RX ADMIN — CEFAZOLIN SODIUM 1 G: 1 INJECTION, SOLUTION INTRAVENOUS at 11:58

## 2024-05-12 RX ADMIN — Medication 2 L/MIN: at 10:30

## 2024-05-12 RX ADMIN — EPHEDRINE SULFATE 5 MG: 50 INJECTION, SOLUTION INTRAVENOUS at 08:52

## 2024-05-12 RX ADMIN — Medication 80 MCG: at 08:47

## 2024-05-12 RX ADMIN — NIFEDIPINE 30 MG: 30 TABLET, FILM COATED, EXTENDED RELEASE ORAL at 03:16

## 2024-05-12 SDOH — HEALTH STABILITY: MENTAL HEALTH: CURRENT SMOKER: 0

## 2024-05-12 ASSESSMENT — PAIN DESCRIPTION - DESCRIPTORS
DESCRIPTORS: HEADACHE

## 2024-05-12 ASSESSMENT — PAIN SCALES - GENERAL
PAINLEVEL_OUTOF10: 5 - MODERATE PAIN
PAINLEVEL_OUTOF10: 2
PAINLEVEL_OUTOF10: 0 - NO PAIN
PAINLEVEL_OUTOF10: 4
PAINLEVEL_OUTOF10: 0 - NO PAIN
PAINLEVEL_OUTOF10: 6
PAINLEVEL_OUTOF10: 4
PAINLEVEL_OUTOF10: 0 - NO PAIN
PAINLEVEL_OUTOF10: 5 - MODERATE PAIN
PAINLEVEL_OUTOF10: 0 - NO PAIN
PAINLEVEL_OUTOF10: 4
PAINLEVEL_OUTOF10: 2
PAINLEVEL_OUTOF10: 4
PAINLEVEL_OUTOF10: 6
PAINLEVEL_OUTOF10: 9
PAINLEVEL_OUTOF10: 0 - NO PAIN
PAINLEVEL_OUTOF10: 9
PAINLEVEL_OUTOF10: 2
PAINLEVEL_OUTOF10: 8
PAINLEVEL_OUTOF10: 4
PAINLEVEL_OUTOF10: 0 - NO PAIN
PAINLEVEL_OUTOF10: 0 - NO PAIN
PAINLEVEL_OUTOF10: 4
PAINLEVEL_OUTOF10: 0 - NO PAIN
PAINLEVEL_OUTOF10: 6
PAINLEVEL_OUTOF10: 0 - NO PAIN
PAINLEVEL_OUTOF10: 2
PAINLEVEL_OUTOF10: 0 - NO PAIN
PAINLEVEL_OUTOF10: 0 - NO PAIN
PAINLEVEL_OUTOF10: 4

## 2024-05-12 ASSESSMENT — PAIN DESCRIPTION - LOCATION
LOCATION: NECK
LOCATION: HEAD

## 2024-05-12 ASSESSMENT — ACTIVITIES OF DAILY LIVING (ADL)
PATIENT'S MEMORY ADEQUATE TO SAFELY COMPLETE DAILY ACTIVITIES?: YES
FEEDING YOURSELF: INDEPENDENT
GROOMING: INDEPENDENT
HEARING - LEFT EAR: FUNCTIONAL
ADEQUATE_TO_COMPLETE_ADL: YES
LACK_OF_TRANSPORTATION: NO
JUDGMENT_ADEQUATE_SAFELY_COMPLETE_DAILY_ACTIVITIES: YES
HEARING - RIGHT EAR: FUNCTIONAL
BATHING: INDEPENDENT
DRESSING YOURSELF: INDEPENDENT
TOILETING: INDEPENDENT
WALKS IN HOME: INDEPENDENT

## 2024-05-12 ASSESSMENT — PAIN - FUNCTIONAL ASSESSMENT
PAIN_FUNCTIONAL_ASSESSMENT: 0-10
PAIN_FUNCTIONAL_ASSESSMENT: 0-10

## 2024-05-12 NOTE — ANESTHESIA PROCEDURE NOTES
Epidural Block    Patient location during procedure: OB  Start time: 5/12/2024 8:01 AM  End time: 5/12/2024 8:08 AM  Reason for block: labor analgesia  Staffing  Performed: resident   Authorized by: Junior Reynolds MD    Performed by: Junior Reynolds MD    Preanesthetic Checklist  Completed: patient identified, IV checked, risks and benefits discussed, surgical consent, pre-op evaluation, timeout performed and sterile techniques followed  Block Timeout  RN/Licensed healthcare professional reads aloud to the Anesthesia provider and entire team: Patient identity, procedure with side and site, patient position, and as applicable the availability of implants/special equipment/special requirements.  Patient on coagulant treatment: no  Timeout performed at: 5/12/2024 8:00 AM  Block Placement  Patient position: sitting  Prep: ChloraPrep  Sterility prep: cap, drape, gloves, mask and hand  Sedation level: no sedation  Patient monitoring: blood pressure, heart rate and continuous pulse oximetry  Approach: midline  Local numbing: lidocaine 1% to skin and subcutaneous tissues  Vertebral space: lumbar  Lumbar location: L3-L4  Epidural  Loss of resistance technique: saline  Guidance: landmark technique        Needle  Needle type: Tuohy   Needle gauge: 17  Needle length: 11.4 cm  Needle insertion depth: 9.5 cm  Catheter type: multi-orifice  Catheter size: 19 G  Catheter at skin depth: 14.5 cm  Catheter securement method: liquid medical adhesive and clear occlusive dressing    Test dose: lidocaine 1.5% with epinephrine 1-to-200,000  Test dose: lidocaine 1.5% with epinephrine 1-to-200,000  Test dose result: no positive test dose    PCEA  Medication concentration used: 0.044% Bupivacaine with 1.25 mcg/mL Fentanyl and 1:[id number] Epinephrine  Dose (mL): 10  Lockout (minutes): 15  1-Hour Limit (boluses/hr): 54  Basal Rate: 14        Assessment  Sensory level: T10 bilateral  Block outcome: pain improved  Number of attempts: 1  Events: no  positive test dose  Procedure assessment: patient tolerated procedure well with no immediate complications

## 2024-05-12 NOTE — ANESTHESIA PROCEDURE NOTES
Epidural Block    Patient location during procedure: OB  Start time: 5/12/2024 10:40 AM  End time: 5/12/2024 10:55 AM  Reason for block: labor analgesia  Staffing  Performed: resident   Authorized by: Anatoliy Parisi MD PhD    Performed by: Junior Reynolds MD    Preanesthetic Checklist  Completed: patient identified, IV checked, risks and benefits discussed, surgical consent, pre-op evaluation, timeout performed and sterile techniques followed  Block Timeout  RN/Licensed healthcare professional reads aloud to the Anesthesia provider and entire team: Patient identity, procedure with side and site, patient position, and as applicable the availability of implants/special equipment/special requirements.  Patient on coagulant treatment: no  Timeout performed at: 5/12/2024 10:38 AM  Block Placement  Patient position: sitting  Prep: ChloraPrep  Sterility prep: cap, drape, gloves, mask and hand  Sedation level: no sedation  Patient monitoring: blood pressure, heart rate and continuous pulse oximetry  Approach: midline  Local numbing: lidocaine 1% to skin and subcutaneous tissues  Vertebral space: lumbar  Lumbar location: L2-L3  Epidural  Loss of resistance technique: saline  Guidance: landmark technique        Needle  Needle type: Tuohy   Needle gauge: 17  Needle length: 11.4 cm  Needle insertion depth: 7.5 cm  Catheter type: multi-orifice  Catheter size: 19 G  Catheter at skin depth: 12 cm  Catheter securement method: liquid medical adhesive and clear occlusive dressing    Test dose: lidocaine 1.5% with epinephrine 1-to-200,000  Test dose: lidocaine 1.5% with epinephrine 1-to-200,000  Test dose result: no positive test dose    PCEA  Medication concentration used: 0.044% Bupivacaine with 1.25 mcg/mL Fentanyl and 1:[id number] Epinephrine  Dose (mL): 10  Lockout (minutes): 15  1-Hour Limit (boluses/hr): 54  Basal Rate: 14        Assessment  Sensory level: T10 bilateral  Block outcome: pain improved  Number of attempts:  2  Events: blood aspirated, intravascular injection and no positive test dose  Procedure assessment: patient tolerated procedure well with no immediate complications  Additional Notes  Events following previous epidural placement documented in memos. Patient endorsing improvements in all symptomatology including numbness in upper extremities, nausea, confusion, exhaustion. Prior to restarting infusion aspirated with 3cc syringe with minor blood tinged fluid aspirated. Unclear whether catheter migrated intravascularly or if vessel completely punctured with positive aspiration due to multi-orifice catheter. Patient ultimately agreeable to repeating procedure. Initially advanced tuohy needle between L3/L4 space with positive tam heme aspirated. Reintroduced at L2/L3 level, negative for blood aspiration. Negative test dose. Catheter threaded easily.

## 2024-05-12 NOTE — PROGRESS NOTES
Intrapartum Progress Note    Assessment/Plan   Melania You is a 28 y.o.  at 35w3d. ASHLEY: 2024    siPEC w/ SF   - Dx by sustained severe range Bps   - cHTN, previously not on meds  - HA, s/p Tylenol > resolved and now asx   - HELLP labs wnl x2  - S/p IV Labetalol 20 x2 (last 0700, )  - Nifed 60 mg (, AM)  - Epidural placement this am around 0830 c/b hypotension after to 70s/30s at lowest -> treated by anesthesia. Pt symptomatic during that time. Now after treatment BP improved and pt asymptomatic.     IOL  - S/p CRB  - Pit on at 0330  - Likely SROMed at approx 0930 this am, difficult to assess on this exam due to very posterior cervix and head not well applied. Will cont to assess for LOF    Fetal status  - LGA growth pattern: last growth: 2768g (98%), AC >99%, DENY on . Extrapolates to 3375g. Previously counseled on SD  - cEFM, Cat I      Post-partum  - BCM: declines   - Feeds: plans to breast feed    Pt seen and d/w Dr. Nancy Gamez MD PGY-2      Principal Problem:    Labor and delivery indication for care or intervention ([facility])        Subjective   Patient resting comfortably in bed. Denies HA, scotoma, RUQ pain, SOB, chest pain. Earlier this am, epidural placement was complicated by hypotension, at that time pt had headache, felt nauseous.       Objective   Last Vitals:  Temp Pulse Resp BP MAP Pulse Ox   36 °C (96.8 °F) 96 20 122/68   99 %     Vitals Min/Max Last 24 Hours:  Temp  Min: 36 °C (96.8 °F)  Max: 36.5 °C (97.7 °F)  Pulse  Min: 69  Max: 109  Resp  Min: 16  Max: 20  BP  Min: 74/36  Max: 184/87    Intake/Output:    Intake/Output Summary (Last 24 hours) at 2024 1124  Last data filed at 2024 1109  Gross per 24 hour   Intake 2320.48 ml   Output 930 ml   Net 1390.48 ml       Physical Examination:  Constitutional: No visible distress, alert and cooperative  Respiratory/Thorax: Normal respiratory effort on RA. Lungs CTAB.  Cardiovascular: RRR  Gastrointestinal:  soft, nondistended, nontender, gravid  Psychological: Appropriate mood and behavior    cEFM: Baseline 130, pos accels, neg decels, mod marsha   [place]: not tracing ctx  SVE: 4/50/-3    Lab Review:  Lab Results   Component Value Date    WBC 12.9 (H) 05/12/2024    HGB 12.0 05/12/2024    HCT 35.6 (L) 05/12/2024     05/12/2024     Lab Results   Component Value Date    GLUCOSE 88 05/12/2024     (L) 05/12/2024    K 3.5 05/12/2024     05/12/2024    CO2 15 (L) 05/12/2024    ANIONGAP 19 05/12/2024    BUN 6 05/12/2024    CREATININE 0.39 (L) 05/12/2024    EGFR >90 05/12/2024    CALCIUM 8.0 (L) 05/12/2024    ALBUMIN 3.4 05/12/2024    PROT 6.0 (L) 05/12/2024    ALKPHOS 90 05/12/2024    ALT 12 05/12/2024    AST 12 05/12/2024    BILITOT 0.6 05/12/2024

## 2024-05-12 NOTE — ED PROVIDER NOTES
HPI   Chief Complaint   Patient presents with    Hypertension       Patient presented for evaluation of hypertension and pregnancy.  Patient notes she was measuring her blood pressure today and it was elevated.  Patient had home blood pressure readings of 170/109.  Patient states she is having headache.  Patient denies vision change.  Patient denies any new swelling in her ankles.  Patient states she had a previous pregnancy that she had similar symptoms and required magnesium and [place] [facility] [place]  [place] [facility] [place]  Patient is G4, P1 at approximately 35 weeks gestation.  Patient denies abdominal pain or vaginal bleeding.      History provided by:  Patient                      [place] Coma Scale Score: 15         NIH Stroke Scale: 0             Patient History   Past Medical History:   Diagnosis Date    Gestational diabetes ([facility])     Hypertension affecting pregnancy ([facility])     Obesity complicating pregnancy, unspecified trimester ([facility]) 05/24/2021    Obesity in pregnancy, antepartum    Supervision of other high risk pregnancies, unspecified trimester ([facility]) 05/24/2021    Susceptible to varicella (non-immune), currently pregnant    Unspecified maternal hypertension, unspecified trimester ([facility]) 06/14/2021    Hypertension affecting pregnancy     Past Surgical History:   Procedure Laterality Date    OTHER SURGICAL HISTORY  12/21/2020    No history of surgery     Family History   Problem Relation Name Age of Onset    Hypertension Mother      Diabetes Mother      Hypertension Father       Social History     Tobacco Use    Smoking status: Never    Smokeless tobacco: Never   Vaping Use    Vaping status: Never Used   Substance Use Topics    Alcohol use: Not Currently    Drug use: Never       Physical Exam   ED Triage Vitals [05/11/24 2100]   Temperature Heart Rate Respirations BP   36.2 °C (97.2 °F) (!) 109 18 (!) 184/92      Pulse Ox Temp Source Heart Rate Source Patient Position   97 %  Temporal -- Sitting      BP Location FiO2 (%)     Right arm --       Physical Exam  Vitals and nursing note reviewed.   Constitutional:       Appearance: Normal appearance. She is obese.   HENT:      Head: Atraumatic.      Right Ear: External ear normal.      Left Ear: External ear normal.      Nose: Nose normal.      Mouth/Throat:      Mouth: Mucous membranes are moist.   Eyes:      Extraocular Movements: Extraocular movements intact.      Pupils: Pupils are equal, round, and reactive to light.   Cardiovascular:      Rate and Rhythm: Regular rhythm. Tachycardia present.      Pulses: Normal pulses.   Pulmonary:      Effort: Pulmonary effort is normal.      Breath sounds: Normal breath sounds.   Abdominal:      Palpations: Abdomen is soft.      Tenderness: There is no abdominal tenderness.   Musculoskeletal:         General: No tenderness. Normal range of motion.      Cervical back: Normal range of motion and neck supple. No rigidity or tenderness.   Skin:     General: Skin is warm and dry.   Neurological:      General: No focal deficit present.      Mental Status: She is alert and oriented to person, place, and time. Mental status is at baseline.   Psychiatric:         Mood and Affect: Mood normal.         Behavior: Behavior normal.         ED Course & MDM   ED Course as of 05/12/24 0139   Sat May 11, 2024   2145 Reassessed patient.  Significant fluid around the blood pressure after starting magnesium.  Patient continues have headache.  Denies vision change.  Fetal heart tones 130 [JA]   2157 Discussed with OB at [facility] and they accept [place] for transfer.  [JA]      ED Course User Index  [JA] Damian Garcia DO         Diagnoses as of 05/12/24 0139   Hypertension affecting pregnancy in third trimester ([facility])       Medical Decision Making  Differential diagnosis: Preeclampsia, hypertension during pregnancy, premature labor, headache, hypertensive crisis, other    Patient presented for hypertension and pregnancy.   Patient notes she has a history of preeclampsia requiring emergent vaginal delivery with her previous child.  Patient notes history of hypertension during pregnancy.  Patient is not taking antihypertensives.  Patient is having headache but denies vision changes this time.  Unclear as to whether or not there is edema to the lower extremities.  IV magnesium started in the ED.  Discussed with OB and accept patient for transfer to their service.  Blood pressure improving in the ED.  Headache improving.  No focal neurodeficit on exam.  No nuchal rigidity.  Afebrile.  Labs pending at time of transfer.        Procedure  Critical Care    Performed by: Damian Garcia DO  Authorized by: Damian Garcia DO    Critical care provider statement:     Critical care time (minutes):  35    Critical care time was exclusive of:  Separately billable procedures and treating other patients    Critical care was necessary to treat or prevent imminent or life-threatening deterioration of the following conditions: Hypertension during pregnancy requiring IV magnesium.    Critical care was time spent personally by me on the following activities:  Discussions with consultants, ordering and performing treatments and interventions, pulse oximetry, re-evaluation of patient's condition, review of old charts, evaluation of patient's response to treatment, development of treatment plan with patient or surrogate and ordering and review of laboratory studies      [id number] 21: 07 sinus rhythm, rate 99, normal axis, ST segments normal, T waves normal, normal EKG.  EKG interpreted by myself.     Damian Garcia DO  24 0139

## 2024-05-12 NOTE — ANESTHESIA PREPROCEDURE EVALUATION
Patient: Melania You    Evaluation Method: In-person visit    Procedure Information    Date: 05/12/24  Procedure: Labor Analgesia         Relevant Problems   Anesthesia (within normal limits)      Cardiac   (+) Chronic hypertension   (+) Pre-eclampsia, severe, delivered ([facility])      Pulmonary (within normal limits)      Neuro (within normal limits)      GI (within normal limits)      /Renal (within normal limits)      Liver (within normal limits)      Endocrine (within normal limits)      Hematology (within normal limits)      Musculoskeletal (within normal limits)      HEENT (within normal limits)      ID (within normal limits)      Skin (within normal limits)      GYN   (+) Supervision of other normal pregnancy, antepartum ([facility])       Clinical information reviewed:   Tobacco      Surg Hx            NPO Detail:  No data recorded     OB/Gyn Evaluation    Present Pregnancy    Patient is pregnant now.   Obstetric History                Physical Exam    Airway  Mallampati: III  TM distance: >3 FB  Neck ROM: full     Cardiovascular    Dental    Pulmonary    Abdominal            Anesthesia Plan    History of general anesthesia?: no  History of complications of general anesthesia?: no    ASA 3     epidural     The patient is not a current smoker.    Anesthetic plan and risks discussed with patient.  Use of blood products discussed with patient who.    Plan discussed with resident and attending.

## 2024-05-12 NOTE — SIGNIFICANT EVENT
Provider to bedside for placement of IUPC by nursing request 2/2 difficulty tracing contractions.     On exam, cervix unchanged from previous, however presenting part barley palpable despite patient thinking she SROM'd. BSUS confirming cephalic positioning.   Patient repositioned to throne. Will continue pitocin and reassess cervical exam and fetal positioning.     Abbey Spencer MD

## 2024-05-12 NOTE — H&P
Obstetrical Admission History and Physical    Assessment/Plan    29 yo  at 35.3 wga by lmp c/w 13 week scan presenting as transfer for IOL for sPEC by severe range blood pressures.    siPEC w/ SF   - Dx by sustained severe range Bps   - cHTN, previously not on meds  - HA, s/p Tylenol > resolved ans now asx   - HELLP labs wnl x1, for repeat labs 6 hours from initial set   - S/p IV Labetalol 20 (0050, )  - Started on Nifed 30 mg ()    IOL  - Admitted to L&D, consented, scanned - cephalic  - Induction started with CRB and pit  - T&S and CBC on admission. Starting hgb 12.0  - H/o PPH, per documentation, likely 2/2 to atony. T&C x1u pRBC   - delivery plan: plan for vaginal delivery, patient counseled on induction of labor and possibility of c/s for failure of induction or non-reassuring fetal status    Fetal status  - LGA growth pattern: last growth: 2768g (98%), AC >99%, DENY on . Extrapolates to 3375g. Discussed the risk of shoulder dystocia with pt including lack of oxygen to baby while we resolve dystocia, possible nerve damage and fractures. Pt expressed understanding   - cEFM, Cat I     Post-partum  - BCM: declines   - Feeds: plans to breast feed    D/w and seen by Dr. Last Chau MD, PGY-2     Subjective   29 yo  at 35.3 wga by lmp c/w 13 week scan presenting as transfer for IOL for sPEC by severe range blood pressures. H/o cHTN but not currently on meds. Had a headache earlier in the day which prompted her to take her blood pressure which was severe range. Upon presentation to [place], blood pressures found to be severe range. No IV treatment prior to transfer. On presentation, patient denies HA, scotomas, CP, SOB and RUQ pain. Denies contractions. Reporting good FM. Denie VB and LOF.     Pregnancy n/f:   - LGA growth pattern. Last growth: 2768g (98%), AC >99%, DENY on   - H/o cHTN, no meds  - H/o PEC and GDM in previous pregnancy   - Class III obesity, BMI 53  - Rh negative,  s/p Rhogam on 3/19       Obstetrical History   OB History    Para Term  AB Living   4 1 0 1 2 1   SAB IAB Ectopic Multiple Live Births   2 0 0 0 1      # Outcome Date GA Lbr Jaspreet/2nd Weight Sex Delivery Anes PTL Lv   4 Current            3  21 34w2d  2.778 kg M   Y MARIAM   2 SAB  7w0d          1 SAB  7w0d              Past Medical History  Past Medical History:   Diagnosis Date    Gestational diabetes ([facility])     Hypertension affecting pregnancy ([facility])     Obesity complicating pregnancy, unspecified trimester ([facility]) 2021    Obesity in pregnancy, antepartum    Supervision of other high risk pregnancies, unspecified trimester ([facility]) 2021    Susceptible to varicella (non-immune), currently pregnant    Unspecified maternal hypertension, unspecified trimester ([facility]) 2021    Hypertension affecting pregnancy        Past Surgical History   Past Surgical History:   Procedure Laterality Date    OTHER SURGICAL HISTORY  2020    No history of surgery       Social History  Social History     Tobacco Use    Smoking status: Never    Smokeless tobacco: Never   Substance Use Topics    Alcohol use: Not Currently     Substance and Sexual Activity   Drug Use Never       Allergies  Penicillins     Medications  Medications Prior to Admission   Medication Sig Dispense Refill Last Dose    acetaminophen (TYLENOL ORAL)        BABY ASPIRIN ORAL Take 162 mg by mouth once every day.       cetirizine HCl (ZYRTEC ORAL)        prenatal vit,tiara 74-iron-folic (Prenatal Low Iron) 27 mg iron- 1 mg tablet Take 1 tablet by mouth once daily.          Objective    Last Vitals  Temp Pulse Resp BP MAP O2 Sat   36.5 °C (97.7 °F) 92 18 (!) 142/90   98 %     Physical Examination  Constitutional: no visible distress, alert and cooperative  Eyes: clear sclera  Head/Neck: normocephalic  Respiratory/Thorax: normal respiratory effort on RA  Cardiovascular: regular rate   Gastrointestinal: abdomen soft,  gravid, non-tender to palpation, without rebound or guarding  Musculoskeletal: grossly normal ROM  Extremities: BLEs symmetrical   Neurological: no gross deficits appreciated   Psychological: Appropriate mood and behavior  Skin: warm, dry, no lesions  SVE: 1/30/-3  FHR: 130/mod/+accel/-decel  [place]: q3 mins     Lab Review  Lab Results   Component Value Date    WBC 12.9 (H) 05/12/2024    HGB 12.0 05/12/2024    HCT 35.6 (L) 05/12/2024     05/12/2024     Lab Results   Component Value Date    GLUCOSE 88 05/12/2024     (L) 05/12/2024    K 3.5 05/12/2024     05/12/2024    CO2 15 (L) 05/12/2024    ANIONGAP 19 05/12/2024    BUN 6 05/12/2024    CREATININE 0.39 (L) 05/12/2024    EGFR >90 05/12/2024    CALCIUM 8.0 (L) 05/12/2024    ALBUMIN 3.4 05/12/2024    PROT 6.0 (L) 05/12/2024    ALKPHOS 90 05/12/2024    ALT 12 05/12/2024    AST 12 05/12/2024    BILITOT 0.6 05/12/2024

## 2024-05-12 NOTE — SIGNIFICANT EVENT
SVE    Cervical Exam  Dilation: 5  Effacement (%): 50  Fetal Station: -3  Method: Manual  OB Examiner: Vera  Fetal Assessment  Movement: Present  Mode: Fetal scalp electrode  Baseline Fetal Heart Rate (bpm): 130 bpm  Baseline Classification: Normal  Variability: Moderate (Between 6 and 25 BPM)  Pattern: Accelerations  Pattern Observations: Difficulty tracing FHR d/t maternal habitus. RN to continue to readjust EFM  FHR Category: Category I      Contraction Frequency: 2-4      - FSE replaced during this exam, then fell off approx 2 mins later. Will re-attempt external monitors  - Cat I tracing  - Latent labor  - Pt asymptomatic: no HA, vision changes, CP, SOB, RUQ pain    D/w Dr. Nancy Gamez MD PGY-2

## 2024-05-12 NOTE — SIGNIFICANT EVENT
Patient with late  gestation; however, maternal BMI prohibiting continuous fetal tracing at this time. OK to trial Trellie remote monitoring system for continuous monitoring due to known improved monitoring in those with high BMI.     Celina Finney MD

## 2024-05-12 NOTE — SIGNIFICANT EVENT
At bedside to assess CRB. Patient starting to feel more contractions.     FHR: 125/mod/+accel/-decel   [place]: q6 mins   SVE: CRB still in situ     A/P:   siPEC w/ SF   - Dx by sustained severe range Bps   - cHTN, previously not on meds  - HA, s/p Tylenol > resolved and now asx   - HELLP labs wnl x1, for repeat labs 6 hours from initial set   - S/p IV Labetalol 20 x2 (last 0700, 5/12)  - Nifed 60 mg (5/12, AM)     IOL  - CRB in situ still, Pit started at 0330  - cEFM, Cat 1     D/w Dr. Koffi Chau MD

## 2024-05-13 ENCOUNTER — APPOINTMENT (OUTPATIENT)
Dept: OBSTETRICS AND GYNECOLOGY | Facility: CLINIC | Age: 29
End: 2024-05-13
Payer: COMMERCIAL

## 2024-05-13 LAB
ALBUMIN SERPL BCP-MCNC: 2.9 G/DL (ref 3.4–5)
ALP SERPL-CCNC: 93 U/L (ref 33–110)
ALT SERPL W P-5'-P-CCNC: 15 U/L (ref 7–45)
ANION GAP SERPL CALC-SCNC: 17 MMOL/L (ref 10–20)
AST SERPL W P-5'-P-CCNC: 17 U/L (ref 9–39)
BACTERIA UR CULT: NORMAL
BB ANTIBODY IDENTIFICATION: NORMAL
BILIRUB SERPL-MCNC: 0.6 MG/DL (ref 0–1.2)
BUN SERPL-MCNC: 6 MG/DL (ref 6–23)
CALCIUM SERPL-MCNC: 7.1 MG/DL (ref 8.6–10.6)
CASE #: NORMAL
CHLORIDE SERPL-SCNC: 106 MMOL/L (ref 98–107)
CO2 SERPL-SCNC: 18 MMOL/L (ref 21–32)
CREAT SERPL-MCNC: 0.5 MG/DL (ref 0.5–1.05)
EGFRCR SERPLBLD CKD-EPI 2021: >90 ML/MIN/1.73M*2
GLUCOSE SERPL-MCNC: 98 MG/DL (ref 74–99)
MAGNESIUM SERPL-MCNC: 4.37 MG/DL (ref 1.6–2.4)
PATH REV-IMMUNOHEMATOLOGY-PR30: NORMAL
POTASSIUM SERPL-SCNC: 3.8 MMOL/L (ref 3.5–5.3)
PROT SERPL-MCNC: 5.4 G/DL (ref 6.4–8.2)
SODIUM SERPL-SCNC: 137 MMOL/L (ref 136–145)

## 2024-05-13 PROCEDURE — 2500000006 HC RX 250 W HCPCS SELF ADMINISTERED DRUGS (ALT 637 FOR ALL PAYERS): Performed by: STUDENT IN AN ORGANIZED HEALTH CARE EDUCATION/TRAINING PROGRAM

## 2024-05-13 PROCEDURE — 2500000004 HC RX 250 GENERAL PHARMACY W/ HCPCS (ALT 636 FOR OP/ED): Performed by: STUDENT IN AN ORGANIZED HEALTH CARE EDUCATION/TRAINING PROGRAM

## 2024-05-13 PROCEDURE — 99199 UNLISTED SPECIAL SVC PX/RPRT: CPT

## 2024-05-13 PROCEDURE — 59409 OBSTETRICAL CARE: CPT | Performed by: OBSTETRICS & GYNECOLOGY

## 2024-05-13 PROCEDURE — 2500000001 HC RX 250 WO HCPCS SELF ADMINISTERED DRUGS (ALT 637 FOR MEDICARE OP): Performed by: STUDENT IN AN ORGANIZED HEALTH CARE EDUCATION/TRAINING PROGRAM

## 2024-05-13 PROCEDURE — 88307 TISSUE EXAM BY PATHOLOGIST: CPT | Performed by: PATHOLOGY

## 2024-05-13 PROCEDURE — 1100000001 HC PRIVATE ROOM DAILY

## 2024-05-13 PROCEDURE — 83735 ASSAY OF MAGNESIUM: CPT | Performed by: STUDENT IN AN ORGANIZED HEALTH CARE EDUCATION/TRAINING PROGRAM

## 2024-05-13 PROCEDURE — 51701 INSERT BLADDER CATHETER: CPT

## 2024-05-13 PROCEDURE — 2500000005 HC RX 250 GENERAL PHARMACY W/O HCPCS: Performed by: STUDENT IN AN ORGANIZED HEALTH CARE EDUCATION/TRAINING PROGRAM

## 2024-05-13 PROCEDURE — 59410 OBSTETRICAL CARE: CPT | Performed by: STUDENT IN AN ORGANIZED HEALTH CARE EDUCATION/TRAINING PROGRAM

## 2024-05-13 PROCEDURE — 7100000016 HC LABOR RECOVERY PER HOUR

## 2024-05-13 PROCEDURE — 59050 FETAL MONITOR W/REPORT: CPT

## 2024-05-13 PROCEDURE — 80053 COMPREHEN METABOLIC PANEL: CPT | Performed by: STUDENT IN AN ORGANIZED HEALTH CARE EDUCATION/TRAINING PROGRAM

## 2024-05-13 PROCEDURE — 0UQMXZZ REPAIR VULVA, EXTERNAL APPROACH: ICD-10-PCS | Performed by: STUDENT IN AN ORGANIZED HEALTH CARE EDUCATION/TRAINING PROGRAM

## 2024-05-13 PROCEDURE — 7210000002 HC LABOR PER HOUR

## 2024-05-13 PROCEDURE — 88307 TISSUE EXAM BY PATHOLOGIST: CPT | Mod: TC,SUR | Performed by: STUDENT IN AN ORGANIZED HEALTH CARE EDUCATION/TRAINING PROGRAM

## 2024-05-13 PROCEDURE — 36415 COLL VENOUS BLD VENIPUNCTURE: CPT | Performed by: STUDENT IN AN ORGANIZED HEALTH CARE EDUCATION/TRAINING PROGRAM

## 2024-05-13 RX ORDER — LOPERAMIDE HYDROCHLORIDE 2 MG/1
4 CAPSULE ORAL EVERY 2 HOUR PRN
Status: DISCONTINUED | OUTPATIENT
Start: 2024-05-13 | End: 2024-05-16 | Stop reason: HOSPADM

## 2024-05-13 RX ORDER — OXYTOCIN 10 [USP'U]/ML
10 INJECTION, SOLUTION INTRAMUSCULAR; INTRAVENOUS ONCE AS NEEDED
Status: DISCONTINUED | OUTPATIENT
Start: 2024-05-13 | End: 2024-05-16 | Stop reason: HOSPADM

## 2024-05-13 RX ORDER — ONDANSETRON HYDROCHLORIDE 2 MG/ML
4 INJECTION, SOLUTION INTRAVENOUS EVERY 6 HOURS PRN
Status: DISCONTINUED | OUTPATIENT
Start: 2024-05-13 | End: 2024-05-16 | Stop reason: HOSPADM

## 2024-05-13 RX ORDER — HYDRALAZINE HYDROCHLORIDE 20 MG/ML
5 INJECTION INTRAMUSCULAR; INTRAVENOUS ONCE AS NEEDED
Status: DISCONTINUED | OUTPATIENT
Start: 2024-05-13 | End: 2024-05-16 | Stop reason: HOSPADM

## 2024-05-13 RX ORDER — TRANEXAMIC ACID 100 MG/ML
1000 INJECTION, SOLUTION INTRAVENOUS ONCE AS NEEDED
Status: DISCONTINUED | OUTPATIENT
Start: 2024-05-13 | End: 2024-05-16 | Stop reason: HOSPADM

## 2024-05-13 RX ORDER — ACETAMINOPHEN 325 MG/1
975 TABLET ORAL EVERY 6 HOURS
Status: DISCONTINUED | OUTPATIENT
Start: 2024-05-13 | End: 2024-05-16 | Stop reason: HOSPADM

## 2024-05-13 RX ORDER — LABETALOL HYDROCHLORIDE 5 MG/ML
20 INJECTION, SOLUTION INTRAVENOUS ONCE AS NEEDED
Status: DISCONTINUED | OUTPATIENT
Start: 2024-05-13 | End: 2024-05-16 | Stop reason: HOSPADM

## 2024-05-13 RX ORDER — DIPHENHYDRAMINE HYDROCHLORIDE 50 MG/ML
25 INJECTION INTRAMUSCULAR; INTRAVENOUS EVERY 6 HOURS PRN
Status: DISCONTINUED | OUTPATIENT
Start: 2024-05-13 | End: 2024-05-13 | Stop reason: SDUPTHER

## 2024-05-13 RX ORDER — LIDOCAINE 560 MG/1
1 PATCH PERCUTANEOUS; TOPICAL; TRANSDERMAL
Status: DISCONTINUED | OUTPATIENT
Start: 2024-05-13 | End: 2024-05-16 | Stop reason: HOSPADM

## 2024-05-13 RX ORDER — ONDANSETRON 4 MG/1
4 TABLET, FILM COATED ORAL EVERY 6 HOURS PRN
Status: DISCONTINUED | OUTPATIENT
Start: 2024-05-13 | End: 2024-05-16 | Stop reason: HOSPADM

## 2024-05-13 RX ORDER — SIMETHICONE 80 MG
80 TABLET,CHEWABLE ORAL 4 TIMES DAILY PRN
Status: DISCONTINUED | OUTPATIENT
Start: 2024-05-13 | End: 2024-05-16 | Stop reason: HOSPADM

## 2024-05-13 RX ORDER — DIPHENHYDRAMINE HYDROCHLORIDE 50 MG/ML
25 INJECTION INTRAMUSCULAR; INTRAVENOUS EVERY 6 HOURS PRN
Status: DISCONTINUED | OUTPATIENT
Start: 2024-05-13 | End: 2024-05-16 | Stop reason: HOSPADM

## 2024-05-13 RX ORDER — METOCLOPRAMIDE 10 MG/1
10 TABLET ORAL EVERY 6 HOURS PRN
Status: DISCONTINUED | OUTPATIENT
Start: 2024-05-13 | End: 2024-05-16 | Stop reason: HOSPADM

## 2024-05-13 RX ORDER — OXYTOCIN/0.9 % SODIUM CHLORIDE 30/500 ML
60 PLASTIC BAG, INJECTION (ML) INTRAVENOUS ONCE AS NEEDED
Status: COMPLETED | OUTPATIENT
Start: 2024-05-13 | End: 2024-05-13

## 2024-05-13 RX ORDER — ADHESIVE BANDAGE
10 BANDAGE TOPICAL
Status: DISCONTINUED | OUTPATIENT
Start: 2024-05-13 | End: 2024-05-16 | Stop reason: HOSPADM

## 2024-05-13 RX ORDER — NIFEDIPINE 30 MG/1
30 TABLET, FILM COATED, EXTENDED RELEASE ORAL ONCE
Status: COMPLETED | OUTPATIENT
Start: 2024-05-13 | End: 2024-05-13

## 2024-05-13 RX ORDER — ENOXAPARIN SODIUM 100 MG/ML
80 INJECTION SUBCUTANEOUS EVERY 24 HOURS
Status: DISCONTINUED | OUTPATIENT
Start: 2024-05-14 | End: 2024-05-15

## 2024-05-13 RX ORDER — IBUPROFEN 600 MG/1
600 TABLET ORAL EVERY 6 HOURS
Status: DISCONTINUED | OUTPATIENT
Start: 2024-05-13 | End: 2024-05-16 | Stop reason: HOSPADM

## 2024-05-13 RX ORDER — CARBOPROST TROMETHAMINE 250 UG/ML
250 INJECTION, SOLUTION INTRAMUSCULAR ONCE AS NEEDED
Status: DISCONTINUED | OUTPATIENT
Start: 2024-05-13 | End: 2024-05-16 | Stop reason: HOSPADM

## 2024-05-13 RX ORDER — DIPHENHYDRAMINE HCL 25 MG
25 CAPSULE ORAL EVERY 6 HOURS PRN
Status: DISCONTINUED | OUTPATIENT
Start: 2024-05-13 | End: 2024-05-13 | Stop reason: SDUPTHER

## 2024-05-13 RX ORDER — SODIUM CHLORIDE, SODIUM LACTATE, POTASSIUM CHLORIDE, CALCIUM CHLORIDE 600; 310; 30; 20 MG/100ML; MG/100ML; MG/100ML; MG/100ML
40 INJECTION, SOLUTION INTRAVENOUS CONTINUOUS
Status: DISCONTINUED | OUTPATIENT
Start: 2024-05-13 | End: 2024-05-16 | Stop reason: HOSPADM

## 2024-05-13 RX ORDER — CYCLOBENZAPRINE HCL 10 MG
10 TABLET ORAL 3 TIMES DAILY PRN
Status: DISCONTINUED | OUTPATIENT
Start: 2024-05-13 | End: 2024-05-16 | Stop reason: HOSPADM

## 2024-05-13 RX ORDER — MISOPROSTOL 200 UG/1
800 TABLET ORAL ONCE AS NEEDED
Status: DISCONTINUED | OUTPATIENT
Start: 2024-05-13 | End: 2024-05-16 | Stop reason: HOSPADM

## 2024-05-13 RX ORDER — METHYLERGONOVINE MALEATE 0.2 MG/ML
0.2 INJECTION INTRAVENOUS ONCE AS NEEDED
Status: DISCONTINUED | OUTPATIENT
Start: 2024-05-13 | End: 2024-05-16 | Stop reason: HOSPADM

## 2024-05-13 RX ORDER — NIFEDIPINE 90 MG/1
90 TABLET, EXTENDED RELEASE ORAL
Status: DISCONTINUED | OUTPATIENT
Start: 2024-05-14 | End: 2024-05-16 | Stop reason: HOSPADM

## 2024-05-13 RX ORDER — POLYETHYLENE GLYCOL 3350 17 G/17G
17 POWDER, FOR SOLUTION ORAL 2 TIMES DAILY PRN
Status: DISCONTINUED | OUTPATIENT
Start: 2024-05-13 | End: 2024-05-16 | Stop reason: HOSPADM

## 2024-05-13 RX ORDER — LIDOCAINE 560 MG/1
1 PATCH PERCUTANEOUS; TOPICAL; TRANSDERMAL
Status: DISCONTINUED | OUTPATIENT
Start: 2024-05-13 | End: 2024-05-13

## 2024-05-13 RX ORDER — NIFEDIPINE 10 MG/1
10 CAPSULE ORAL ONCE AS NEEDED
Status: DISCONTINUED | OUTPATIENT
Start: 2024-05-13 | End: 2024-05-16 | Stop reason: HOSPADM

## 2024-05-13 RX ORDER — BISACODYL 10 MG/1
10 SUPPOSITORY RECTAL DAILY PRN
Status: DISCONTINUED | OUTPATIENT
Start: 2024-05-13 | End: 2024-05-16 | Stop reason: HOSPADM

## 2024-05-13 RX ADMIN — MAGNESIUM SULFATE HEPTAHYDRATE 2 G/HR: 40 INJECTION, SOLUTION INTRAVENOUS at 03:24

## 2024-05-13 RX ADMIN — ACETAMINOPHEN 975 MG: 325 TABLET ORAL at 16:20

## 2024-05-13 RX ADMIN — IBUPROFEN 600 MG: 600 TABLET, FILM COATED ORAL at 22:48

## 2024-05-13 RX ADMIN — ACETAMINOPHEN 975 MG: 325 TABLET ORAL at 04:17

## 2024-05-13 RX ADMIN — IBUPROFEN 600 MG: 600 TABLET, FILM COATED ORAL at 04:17

## 2024-05-13 RX ADMIN — RHO(D) IMMUNE GLOBULIN (HUMAN) 300 MCG: 1500 SOLUTION INTRAMUSCULAR at 21:59

## 2024-05-13 RX ADMIN — Medication 60 MILLI-UNITS/MIN: at 03:08

## 2024-05-13 RX ADMIN — NIFEDIPINE 60 MG: 60 TABLET, FILM COATED, EXTENDED RELEASE ORAL at 06:54

## 2024-05-13 RX ADMIN — METOCLOPRAMIDE 10 MG: 10 TABLET ORAL at 09:37

## 2024-05-13 RX ADMIN — CYCLOBENZAPRINE 10 MG: 10 TABLET, FILM COATED ORAL at 09:37

## 2024-05-13 RX ADMIN — MISOPROSTOL 800 MCG: 200 TABLET ORAL at 02:56

## 2024-05-13 RX ADMIN — CARBOPROST TROMETHAMINE 250 MCG: 250 INJECTION, SOLUTION INTRAMUSCULAR at 02:56

## 2024-05-13 RX ADMIN — MAGNESIUM SULFATE HEPTAHYDRATE 2 G/HR: 40 INJECTION, SOLUTION INTRAVENOUS at 14:58

## 2024-05-13 RX ADMIN — ACETAMINOPHEN 975 MG: 325 TABLET ORAL at 22:48

## 2024-05-13 RX ADMIN — IBUPROFEN 600 MG: 600 TABLET, FILM COATED ORAL at 10:22

## 2024-05-13 RX ADMIN — NIFEDIPINE 30 MG: 30 TABLET, FILM COATED, EXTENDED RELEASE ORAL at 10:57

## 2024-05-13 RX ADMIN — IBUPROFEN 600 MG: 600 TABLET, FILM COATED ORAL at 16:20

## 2024-05-13 RX ADMIN — DIPHENHYDRAMINE HYDROCHLORIDE 25 MG: 50 INJECTION, SOLUTION INTRAMUSCULAR; INTRAVENOUS at 09:36

## 2024-05-13 RX ADMIN — ACETAMINOPHEN 975 MG: 325 TABLET ORAL at 10:21

## 2024-05-13 RX ADMIN — SODIUM CHLORIDE, POTASSIUM CHLORIDE, SODIUM LACTATE AND CALCIUM CHLORIDE 40 ML/HR: 600; 310; 30; 20 INJECTION, SOLUTION INTRAVENOUS at 10:28

## 2024-05-13 RX ADMIN — LOPERAMIDE HYDROCHLORIDE 4 MG: 2 CAPSULE ORAL at 03:32

## 2024-05-13 ASSESSMENT — PAIN SCALES - GENERAL
PAINLEVEL_OUTOF10: 5 - MODERATE PAIN
PAINLEVEL_OUTOF10: 2
PAIN_LEVEL: 7
PAINLEVEL_OUTOF10: 0 - NO PAIN
PAINLEVEL_OUTOF10: 7
PAINLEVEL_OUTOF10: 6
PAINLEVEL_OUTOF10: 3
PAINLEVEL_OUTOF10: 0 - NO PAIN
PAINLEVEL_OUTOF10: 4
PAINLEVEL_OUTOF10: 7
PAINLEVEL_OUTOF10: 7
PAINLEVEL_OUTOF10: 3

## 2024-05-13 ASSESSMENT — PAIN DESCRIPTION - DESCRIPTORS
DESCRIPTORS: PRESSURE
DESCRIPTORS: HEADACHE

## 2024-05-13 NOTE — DISCHARGE INSTRUCTIONS

## 2024-05-13 NOTE — PROGRESS NOTES
"Social Work Note    Patient: Melania You    SW received referral to meet with Ms You due to \"risk screen.\" SW reviewed chart and spoke to bedside RN, no concerns or needs noted. Please re-consult if concerns identified.     CYRUS Hagan      "

## 2024-05-13 NOTE — SIGNIFICANT EVENT
Subjective:  Patient in bed, feeling more pain with contractions. Epidural infusing.     Objective:  Cervical Exam: 7/100/-1   FHT: baseline 135bpm, moderate variability, no accelerations, variable decelerations   Tocometry: contractions q2-3mins     Assessment and Plan  Patient in active labor, unchanged x 2 hours on Pitocin with inadequate contractions on IUPC. FSE replaced. Continue Pitocin per protocol. Continue to monitor for cervical change.    Jann Johnsonane (MS4)     Patient seen and discussed with Dr. Becker and Dr. Paulson.      Ghulam Becker MD  Labor and Delivery    Reviewed MS4 note and made edits within text

## 2024-05-13 NOTE — SIGNIFICANT EVENT
Subjective:  Patient resting in bed    Objective:  Cervical Exam  Dilation: 7  Effacement (%): 100  Fetal Station: -1  Method: Manual  OB Examiner: Jess RUSSO  Fetal Assessment  Movement: Present  Mode: External US  Baseline Fetal Heart Rate (bpm): 130 bpm  Baseline Classification: Normal  Variability: Moderate (Between 6 and 25 BPM)  Pattern: Variable decelerations  Pattern Observations: RN at bedside adjusting external FHR monitor. Difficulty tracing due to patient habitus and frequent position changes.  FHR Category: Category II  Multiple Births: No      Contraction Frequency: 2-3    Assessment&Plan:  - Titrate pitocin per protocol, currently at 30  - Epidural infusing with good pain control  - CEFM; Cat 1 currently    Jazmin Mercado MD PGY1

## 2024-05-13 NOTE — LACTATION NOTE
Lactation Consultant Note  Lactation Consultation  Reason for Consult: Initial assessment  Consultant Name: HATTIE Jimenes    Maternal Information  Has mother  before?: Yes  How long did the mother previously breastfeed?:  2-year-old for 4 months  Previous Maternal Breastfeeding Challenges: None  Infant to breast within first 2 hours of birth?: Yes  Exclusive Pump and Bottle Feed: No    Maternal Assessment  Breast Assessment:  (deferred)    Infant Assessment  Infant Behavior: Deep sleep    Feeding Assessment  Nutrition Source: Breastmilk, Donor human milk  Feeding Method: Nursing at the breast, Paced bottle    LATCH TOOL       Breast Pump       Other OB Lactation Tools       Patient Follow-up       Other OB Lactation Documentation  Maternal Risk Factors: Hypertension, Preeclampsia  Infant Risk Factors: Prematurity <37 weeks, Prelacteal feeds    Recommendations/Summary    I met briefly with this mother who delivered an infant girl @ 35 weeks gestation who is LGA. The mother latched her infant immediately after delivery, however, has subsequently been experiencing a headache  and having difficulty sitting up. Her infant is receiving donor breast milk via bottle and the mother has not yet begun to pump. She has experience nursing her older child for 4 months. We discussed the recommendation to pump to stimulate milk production for infants born at less than 37 weeks gestation. The equipment has been set up in the mother's room when she feels able to begin pumping. She states that she has a breast pump for home and the mother was given contact information on outpatient lactation services. She is asked to call for assistance as needed.

## 2024-05-13 NOTE — CARE PLAN
Problem: Postpartum  Goal: Experiences normal postpartum course  Outcome: Progressing     Problem: Pain - Adult  Goal: Verbalizes/displays adequate comfort level or baseline comfort level  Outcome: Progressing     Patient has had stable vital signs and assessments, pain well controlled, and bleeding has been appropriate this shift.

## 2024-05-13 NOTE — L&D DELIVERY NOTE
OB Delivery Note  2024  Melania You  28 y.o.   Vaginal, Spontaneous       over intact perineum with spontaneous delivery of the placenta. Lower uterine segment atony treated with 800 mcg AR cytotec and 250 mcg IM hemabate. Fundus firm with minimal expression on bimanual exam. Periurethral laceration repaired with 3-0 monocryl.    Gestational Age: 35w4d  /Para:   Quantitative Blood Loss: Admission to Discharge: 564 mL (2024 11:19 PM - 2024  4:05 AM)    Avelino Charlene [[id number]]      Labor Events    Sac identifier: Sac 1  Rupture date/time: 2024 1306  Rupture type: Artificial  Fluid color: Clear  Fluid odor: None  Labor type: Induced Onset of Labor  Labor allowed to proceed with plans for an attempted vaginal birth?: Yes  Induction: Thomas/EASI, Oxytocin  Induction indications: Hypertensive Disorder of Pregnancy  Complications: None       Labor Event Times    Dilation complete date/time: 2024  Start pushing date/time: 2024       Placenta    Placenta delivery date/time: 2024  Placenta removal: Spontaneous  Placenta appearance: Intact  Placenta disposition: lab       Cord    Vessels: 3 vessels  Complications: None  Delayed cord clamping?: Yes  Cord blood disposition: Lab  Gases sent?: No  Stem cell collection (by provider): No       Lacerations    Episiotomy: None  Perineal laceration: None  Periurethral laceration?: Yes  Repair suture: 3-0 Monocryl       Anesthesia    Method: Epidural       Operative Delivery    Forceps attempted?: No  Vacuum extractor attempted?: No       Shoulder Dystocia    Shoulder dystocia present?: No       [place] Delivery    Time head delivered: 2024 02:37:00  Birth date/time: 2024 02:37:00  Delivery type: Vaginal, Spontaneous  Complications: None       Resuscitation    Method: Suctioning, Tactile stimulation, Continuous positive airway pressure (CPAP), Supplemental oxygen       Apgars    Living status:  Living  Apgar Component Scores:  1 min.:  5 min.:  10 min.:  15 min.:  20 min.:    Skin color:  0  1  2      Heart rate:  2  2  2      Reflex irritability:  1  1  2      Muscle tone:  1  1  1      Respiratory effort:  2  2  2      Total:  6  7  9      Apgars assigned by: MADHURI RUSSO       Delivery Providers    Delivering clinician: Vinny Paulson DO   Provider Role    Reba Watson, RN Delivery Nurse    Zonia Patiño, RN Nursery Nurse    Ghulam Becker MD Resident    Jennifer Mehta RN Delivery Nurse                 Ghulam Becker MD

## 2024-05-13 NOTE — SIGNIFICANT EVENT
Patient reporting more pressure. Pt complaining of HA.     Cervical exam 5/100/-1   FHT baseline 130, moderate variability, no accelerations, no decelerations   Tocometry: contractions q2-3 mins     Patient continues in latent labor. Continue to monitor for cervical change. Continue pitocin per protocol. Consider FSE placement if continued difficulty with external monitors. Benadryl and Reglan PRN ordered for FLORES.      Jann Bryan (MS4)     Patient seen and discussed with Dr. Becker and Dr. Khurram Becker MD  Labor and Delivery    Reviewed MS4 note and made edits within text

## 2024-05-13 NOTE — SIGNIFICANT EVENT
Patient feeling need to push.     Cervical exam 5/100/-1   FHT baseline 140, moderate variability, no accelerations, variable decelerations   Tocometry: contractions q2-3mins     FSE placed d/t difficulty with external monitoring. Patient continue in latent labor. Category 2 tracing. Continue to monitor for cervical change. Continue pitocin per protocol.     Jann Bryan (MS4)     Patient seen and discussed with Dr. Wang, Dr. Becker, Dr. Paulson.      Ghulam Becker MD  Labor and Delivery    Reviewed MS4 note and made edits within text.

## 2024-05-13 NOTE — PROGRESS NOTES
Intrapartum Progress Note    Assessment/Plan   Melania You is a 28 y.o.  with siPEC w/ SF s/p  on .    siPEC w/ SF  - Diagnosed based on sustained severe range Bps  - BP regimen: Nifedpine 60mg daily PO   - Continue magnesium 2g/hr for 24 hrs postpartum until  0230, no signs/sxs of magnesium toxicity  - Asymptomatic    Postpartum  - Continue routine pain control and hygiene  - Declines PPBC  - Feeds: plans to breastfeed    Patient seen and discussed with Dr. Spencer.    Shani Talavera, MS4    Principal Problem:    Labor and delivery indication for care or intervention ([facility])    Pregnancy Problems (from 23 to present)       Problem Noted Resolved    Labor and delivery indication for care or intervention ([facility]) 2024 by Mone Rain MD No    Excessive fetal growth affecting management of pregnancy in third trimester ([facility]) 2024 by Leticia Engel MD No    Overview Signed 2024  4:18 PM by Leticia Engel MD     -EFW 98% with AC >99% on scan   -Next scan          Supervision of other normal pregnancy, antepartum ([facility]) 2023 by Marta Carmona MD No    Overview Addendum 2024  3:49 PM by Leticia Engel MD     -s/p flu vaccine   -will get COVID booster  -risk reducing NIPS  -s/p rhogam and Tdap at nurses visit 3/19  -Breast/considering IUD/Dr. Stanley for peds  [ ] Serial growths, NSTs at 34wga, delivery in 39th week (BMI)    Next Visit  [ ] NST  [ ] GBS  [ ] Review home BPs                 Subjective   Patient resting comfortably in bed. Denies HA, scotoma, RUQ pain, SOB, chest pain.    Objective   Last Vitals:  Temp Pulse Resp BP MAP Pulse Ox   36.2 °C (97.2 °F) 109 18 130/69   (!) 93 %     Vitals Min/Max Last 24 Hours:  Temp  Min: 36 °C (96.8 °F)  Max: 37.2 °C (99 °F)  Pulse  Min: 69  Max: 123  Resp  Min: 16  Max: 20  BP  Min: 74/36  Max: 184/105    Intake/Output:    Intake/Output Summary (Last 24 hours) at 2024 0816  Last data filed at  5/13/2024 0630  Gross per 24 hour   Intake 5269.67 ml   Output 2952 ml   Net 2317.67 ml       Physical Examination:  Gen: well appearing, NAD  Resp: Normal respiratory effort, CTAB  Cards: Normal rate, regular rhythm  Neuro: 2+ DTR in bilateral upper extremities  UOP adequate    Lab Review:  Lab Results   Component Value Date    ABO A 05/12/2024    LABRH NEG 05/12/2024    ABSCRN POS 05/12/2024     Lab Results   Component Value Date    WBC 12.9 (H) 05/12/2024    HGB 12.0 05/12/2024    HCT 35.6 (L) 05/12/2024     05/12/2024     Lab Results   Component Value Date    GLUCOSE 88 05/12/2024     (L) 05/12/2024    K 3.5 05/12/2024     05/12/2024    CO2 15 (L) 05/12/2024    ANIONGAP 19 05/12/2024    BUN 6 05/12/2024    CREATININE 0.39 (L) 05/12/2024    EGFR >90 05/12/2024    CALCIUM 8.0 (L) 05/12/2024    ALBUMIN 3.4 05/12/2024    PROT 6.0 (L) 05/12/2024    ALKPHOS 90 05/12/2024    ALT 12 05/12/2024    AST 12 05/12/2024    BILITOT 0.6 05/12/2024

## 2024-05-13 NOTE — PROGRESS NOTES
Intrapartum Progress Note    Assessment/Plan   Melania You is a 28 y.o.  at 35w3d by LMP cw 12w6d us presenting for IOL for siPEC w SF.     IOL   - In latent labor  - Continue pitocin per protocol   - Continue to monitor for cervical change     siPEC w/ SF   - dx by sustained severe range Bps  - Continue Nifedipine 60mg daily 5/13 AM   - HELLP labs wnl x2   - Continue mag gtt     Fetal well-being   - LGA growth pattern: 2768g (98%), AC >99%, DENY on . Extrapolates to 3375g. Previously counseled on SD.   - FSE placed due to difficulty with external monitoring   - CEFM: Cat I currently   - GBS unknown, receiving Ancef     Post-partum   - BCM: declines   - Feeds: plans to breast feed     aJnn Bryan (MS4)     Patient seen and discussed with Dr. Becker and Dr. Paulson.     Ghulam Becker MD  Labor and Delivery    Reviewed MS4 note and made edits within text    Subjective   Patient reports HA returned. Reports increased pressure and painful contractions and feels she needs to push with epidural infusing.     Objective   Last Vitals:  Temp Pulse Resp BP MAP Pulse Ox   36.2 °C (97.2 °F) 93 20 119/61   100 %     Vitals Min/Max Last 24 Hours:  Temp  Min: 36 °C (96.8 °F)  Max: 37.2 °C (99 °F)  Pulse  Min: 69  Max: 114  Resp  Min: 16  Max: 20  BP  Min: 74/36  Max: 184/105    Intake/Output:    Intake/Output Summary (Last 24 hours) at 20241  Last data filed at 20243  Gross per 24 hour   Intake 3818.82 ml   Output 2205 ml   Net 1613.82 ml       Physical Examination:  GENERAL: Examination reveals a well developed, well nourished, gravid female in no acute distress. She is alert and cooperative.  FHR baseline is 135, moderate variability, no accelerations, no decelerations   [place] reading:  contractions q2-3 mins   CERVIX: 5 cm dilated, 100 % effaced, -1 station; MEMBRANES are AROM    Lab Review:  Lab Results   Component Value Date    ABO A 2024    LABRH NEG 2024    ABSCRN POS  05/12/2024     Lab Results   Component Value Date    WBC 12.9 (H) 05/12/2024    HGB 12.0 05/12/2024    HCT 35.6 (L) 05/12/2024     05/12/2024     Lab Results   Component Value Date    GLUCOSE 88 05/12/2024     (L) 05/12/2024    K 3.5 05/12/2024     05/12/2024    CO2 15 (L) 05/12/2024    ANIONGAP 19 05/12/2024    BUN 6 05/12/2024    CREATININE 0.39 (L) 05/12/2024    EGFR >90 05/12/2024    CALCIUM 8.0 (L) 05/12/2024    ALBUMIN 3.4 05/12/2024    PROT 6.0 (L) 05/12/2024    ALKPHOS 90 05/12/2024    ALT 12 05/12/2024    AST 12 05/12/2024    BILITOT 0.6 05/12/2024

## 2024-05-13 NOTE — INDIVIDUALIZED OVERALL PLAN OF CARE NOTE
Patient evaluated on rounds. Appropriate for transfer to the floor for continued pp Mg.   See subsequent note for details.     Abbey Spencer MD

## 2024-05-13 NOTE — ANESTHESIA POSTPROCEDURE EVALUATION
"Patient: Melania You    Procedure Summary       Date: 05/12/24 Room / Location:     Anesthesia Start: 0801 Anesthesia Stop: 05/13/24 0237    Procedure: Labor Analgesia Diagnosis:     Scheduled Providers:  Responsible Provider: Anatoliy Parisi MD PhD    Anesthesia Type: epidural ASA Status: 3            Anesthesia Type: epidural      Vitals:    05/13/24 1150   BP: (!) 146/82   Pulse: 100   Resp: 16   Temp:    SpO2: 98%       Anesthesia Post Evaluation    Patient location during evaluation: bedside  Patient participation: complete - patient participated  Level of consciousness: awake and alert (drowsy)  Pain score: 7  Pain management: inadequate  Airway patency: patent  Cardiovascular status: acceptable  Respiratory status: acceptable  Hydration status: acceptable  Postoperative Nausea and Vomiting: none  Comments: Pt c/o 5/10 constant headache while sitting up in bed. Prior to pain meds, H/A was 7/10. States pain \"behind the eyes\" radiating to neck and shoulders. Instucted patient to lie flat in bed; once supine, pt H/A to 0/10. Discussed with patient potential for PDPH. Encouraged patient to remain supine as much as possible, drink fluids and caffeine. Instructed patient that we will see her in the morning to discuss further treatment as warranted. Pt verbalized understanding. Discussed with Dr. Veronica Gómez.        No notable events documented.    "

## 2024-05-14 ENCOUNTER — ANESTHESIA EVENT (OUTPATIENT)
Dept: OBSTETRICS AND GYNECOLOGY | Facility: HOSPITAL | Age: 29
End: 2024-05-14
Payer: COMMERCIAL

## 2024-05-14 ENCOUNTER — ANESTHESIA (OUTPATIENT)
Dept: OBSTETRICS AND GYNECOLOGY | Facility: HOSPITAL | Age: 29
End: 2024-05-14
Payer: COMMERCIAL

## 2024-05-14 LAB — GP B STREP GENITAL QL CULT: NORMAL

## 2024-05-14 PROCEDURE — 3700000001 HC GENERAL ANESTHESIA TIME - INITIAL BASE CHARGE: Performed by: ANESTHESIOLOGY

## 2024-05-14 PROCEDURE — 3700000002 HC GENERAL ANESTHESIA TIME - EACH INCREMENTAL 1 MINUTE: Performed by: ANESTHESIOLOGY

## 2024-05-14 PROCEDURE — 1100000001 HC PRIVATE ROOM DAILY

## 2024-05-14 PROCEDURE — 2500000001 HC RX 250 WO HCPCS SELF ADMINISTERED DRUGS (ALT 637 FOR MEDICARE OP): Performed by: NURSE PRACTITIONER

## 2024-05-14 PROCEDURE — 99199 UNLISTED SPECIAL SVC PX/RPRT: CPT

## 2024-05-14 PROCEDURE — 1120000001 HC OB PRIVATE ROOM DAILY

## 2024-05-14 PROCEDURE — 2500000001 HC RX 250 WO HCPCS SELF ADMINISTERED DRUGS (ALT 637 FOR MEDICARE OP): Performed by: STUDENT IN AN ORGANIZED HEALTH CARE EDUCATION/TRAINING PROGRAM

## 2024-05-14 PROCEDURE — 2500000006 HC RX 250 W HCPCS SELF ADMINISTERED DRUGS (ALT 637 FOR ALL PAYERS): Performed by: STUDENT IN AN ORGANIZED HEALTH CARE EDUCATION/TRAINING PROGRAM

## 2024-05-14 PROCEDURE — 62273 INJECT EPIDURAL PATCH: CPT | Performed by: ANESTHESIOLOGY

## 2024-05-14 RX ADMIN — ACETAMINOPHEN 975 MG: 325 TABLET ORAL at 14:04

## 2024-05-14 RX ADMIN — ACETAMINOPHEN 975 MG: 325 TABLET ORAL at 04:44

## 2024-05-14 RX ADMIN — IBUPROFEN 600 MG: 600 TABLET, FILM COATED ORAL at 14:04

## 2024-05-14 RX ADMIN — IBUPROFEN 600 MG: 600 TABLET, FILM COATED ORAL at 19:49

## 2024-05-14 RX ADMIN — ACETAMINOPHEN 975 MG: 325 TABLET ORAL at 19:49

## 2024-05-14 RX ADMIN — NIFEDIPINE 90 MG: 90 TABLET, FILM COATED, EXTENDED RELEASE ORAL at 08:14

## 2024-05-14 RX ADMIN — IBUPROFEN 600 MG: 600 TABLET, FILM COATED ORAL at 04:44

## 2024-05-14 SDOH — HEALTH STABILITY: MENTAL HEALTH: CURRENT SMOKER: 0

## 2024-05-14 ASSESSMENT — PAIN SCALES - GENERAL
PAINLEVEL_OUTOF10: 2
PAINLEVEL_OUTOF10: 6
PAINLEVEL_OUTOF10: 3

## 2024-05-14 ASSESSMENT — PAIN DESCRIPTION - DESCRIPTORS: DESCRIPTORS: HEADACHE

## 2024-05-14 NOTE — ANESTHESIA POSTPROCEDURE EVALUATION
Patient: Melania You    Procedure Summary       Date: 05/14/24 Room / Location:     Anesthesia Start: 1110 Anesthesia Stop: 1249    Procedure: Epidural Blood Patch Diagnosis:     Scheduled Providers:  Responsible Provider: Veronica Gómez MD    Anesthesia Type: epidural ASA Status: 3            Anesthesia Type: epidural  BP (!) 141/82   Pulse 103   Temp 36.8 °C (98.2 °F) (Temporal)   Resp 18      Anesthesia Post Evaluation    Patient location during evaluation: bedside  Patient participation: complete - patient participated  Level of consciousness: awake and alert  Pain management: adequate  Airway patency: patent  Cardiovascular status: acceptable  Respiratory status: acceptable  Hydration status: acceptable  Postoperative Nausea and Vomiting: none  Comments: Patient sitting upright in bed.  Feeling better since EBP.  Will follow up in am.  Pt to call if needed earlier.    Encounter Notable Events   Notable Event Outcome Phase Comment   Post-dural puncture headache  Intraprocedure

## 2024-05-14 NOTE — PROGRESS NOTES
Postpartum Progress Note    Assessment/Plan   Melania You is a 28 y.o., , who delivered at 35w4d gestation and is now postpartum day 1.      siPEC w SF. Nifed 60. S/p mag sulfate  Rh negative. For PP Rhogam if indicated  Class III obesity (BMI 53)    #Postpartum  - continue routine postpartum care  - pain well controlled on po medications  - dvt risk score    5, for ppx lovenox-> held for blood patch  - spinal HA      #Maternal Well-Being  - emotional support provided  - bonding with infant    #[place] Feeding  - breastfeeding/pumping encouraged; lactation consult prn    #Dispo  - Anticipate DC PPD#3 pending BP control.  - The signs and symptoms of PEC were reviewed with the patient, including unrelenting headache, vision changes/blurred vision, and RUQ pain  - BP cuff for home for checking BP twice a day  - Pt instructed to call primary OB if SBP > 160 or DBP > 110 or if development of PEC symptoms   - On discharge, follow up with primary OB in:       - 2-5 days for BP check        Principal Problem:    Labor and delivery indication for care or intervention ([facility])  Active Problems:    Other complications of spinal and epidural anesthesia during labor and delivery ([facility])    Pregnancy Problems (from 23 to present)       Problem Noted Resolved    Labor and delivery indication for care or intervention ([facility]) 2024 by Mone Rain MD No    Priority:  Medium      Excessive fetal growth affecting management of pregnancy in third trimester ([facility]) 2024 by Leticia Engel MD No    Priority:  Medium      Overview Signed 2024  4:18 PM by Leticia Engel MD     -EFW 98% with AC >99% on scan   -Next scan          Supervision of other normal pregnancy, antepartum ([facility]) 2023 by Marta Carmona MD No    Priority:  Medium      Overview Addendum 2024  3:49 PM by Leticia Engel MD     -s/p flu vaccine   -will get COVID booster  -risk reducing NIPS  -s/p rhogam  and Tdap at nurses visit 3/19  -Breast/considering IUD/Dr. Stanley for peds  [ ] Serial growths, NSTs at 34wga, delivery in 39th week (BMI)    Next Visit  [ ] NST  [ ] GBS  [ ] Review home BPs         Other complications of spinal and epidural anesthesia during labor and delivery ([facility]) 5/14/2024 by Veronica Gómez MD No          Hospital course: no complications  Vaginal Birth  Patient is currently breastfeedingThe patient's blood type is A NEG. The baby's blood type is A POS . Rhogam indicated and given.    Subjective   Her pain is well controlled with current medications  She is passing flatus  She is ambulating well  She is tolerating a Adult diet Regular  She reports no breast or nursing problems  She denies emotional concerns today   Her plan for contraception is none     HA when sitting up, relieved mostly when laying flat.  On schedule for blood patch today.  Discussed bp's/meds/monitoring and follow up.    Objective   Allergies:   Penicillins         Last Vitals:  Temp Pulse Resp BP MAP Pulse Ox   36.8 °C (98.2 °F) 101 18 (!) 141/82   97 %     Vitals Min/Max Last 24 Hours:  Temp  Min: 36.3 °C (97.3 °F)  Max: 36.8 °C (98.2 °F)  Pulse  Min: 83  Max: 114  Resp  Min: 15  Max: 18  BP  Min: 107/68  Max: 150/82    Intake/Output:     Intake/Output Summary (Last 24 hours) at 5/14/2024 1308  Last data filed at 5/14/2024 0233  Gross per 24 hour   Intake 1226.76 ml   Output 1950 ml   Net -723.24 ml       Physical Exam:  General: Examination reveals a well developed, well nourished, female, in no acute distress. She is alert and cooperative.  Lungs: symmetrical, non-labored breathing.  Cardiac: warm, well-perfused.  Abdomen: soft, non-tender.  Fundus: firm, at umbilicus, and nontender.  Extremities: no redness or tenderness in the calves or thighs.  Neurological: alert, oriented, normal speech, no focal findings or movement disorder noted.     Lab Data:  Lab Results   Component Value Date    WBC 12.9 (H) 05/12/2024     HGB 12.0 05/12/2024    HCT 35.6 (L) 05/12/2024     05/12/2024

## 2024-05-14 NOTE — CARE PLAN
The patient's goals for the shift include headache resolved    The clinical goals for the shift include spinal headache resolved/improved      Problem: Postpartum  Goal: Experiences normal postpartum course  Outcome: Progressing  Goal: Minimal s/sx of HDP and BP<160/110  Outcome: Progressing     Problem: Pain - Adult  Goal: Verbalizes/displays adequate comfort level or baseline comfort level  Outcome: Progressing     Problem: Safety - Adult  Goal: Free from fall injury  Outcome: Progressing

## 2024-05-14 NOTE — SIGNIFICANT EVENT
Patient meets criteria for home monitoring of blood pressure post discharge.  Reason: current preeclampsia with severe features,  history of chronic hypertension. Met with patient to assess for availability of home BP monitor.  Patient stated she owns home BP monitor. . Patient educated on importance of continuing to monitor BP at home, recording BP on home monitoring log and s/sx of when to call her provider.  Pt verbalized understanding the above information.

## 2024-05-14 NOTE — ANESTHESIA PROCEDURE NOTES
Blood Patch:  Patient location during procedure: OB/L&D  Reason for Blood Patch: postdural puncture headache  Staffing  Performed: attending   Authorized by: Veronica Gómez MD    Performed by: Veronica Gómez MD    Preanesthetic Checklist  Completed: patient identified, IV checked, risks and benefits discussed, surgical consent, pre-op evaluation, timeout performed and sterile techniques followed  Block Timeout  RN/Licensed healthcare professional reads aloud to the Anesthesia provider and entire team: Patient identity, procedure with side and site, patient position, and as applicable the availability of implants/special equipment/special requirements.  Patient on coagulant treatment: no  Timeout performed at: 5/14/2024 11:20 AM  Epidural  Patient position: sitting  Prep: Chloraprep  Sterility prep: mask, hand, gloves, drape and cap  Patient monitoring: heart rate, continuous pulse oximetry and blood pressure  Approach: midline  Local numbing: lidocaine 1% to skin and subcutaneous tissues  Location: L2-3  Loss of resistance technique: saline  Guidance: landmarks    Needle type: Tuohy   Needle gauge: 17  Needle insertion depth: 9.5 cm  Blood Patch  Location of venous blood draw: left arm  Injection method: epidural needle  Volume of blood injected: 20 mL  Assessment   Number of attempts: 3 or more  Events: well tolerated  Procedure assessment: patient tolerated procedure well with no immediate complications  Postprocedure Assessment  Patient position: sitting up  Headache: improved  Patient will be discharged to home: patient not discharged home  Patient counseled on signs/symptoms for which to call and/or return: yes    Additional Notes  First attempt at L3-4 with possible HANK at 7.5cm  x 2 but no csf with DPE attempt.  Second attempt at L2-3 with longer epidural needle.  HANK at 9.5cm.   Then blood drawn from left hand and 20ml given via epidural needle.    Patient tolerated procedure well and then place supine x 1  hour.    Patient sitting up.  Feels headache is improved.  No neck pain.  Will return to floor and we will follow up with patient later today.

## 2024-05-14 NOTE — ANESTHESIA PREPROCEDURE EVALUATION
Patient: Melania You    Evaluation Method: In-person visit    Procedure Information    Date: 05/12/24  Procedure: Labor Analgesia         Relevant Problems   Anesthesia   (+) Other complications of spinal and epidural anesthesia during labor and delivery ([facility]) (PP day #2 s/p labor epidural requiring 2 placements with the first likely being a wet tap with high level.  Now with PDPH--frontal with neck stiffness.  Headache is resolved/much improved with laying supine.  Patient denies N/V, double vision or change in vision, any numbness/weakness, no fever/chills. Pt is ambulating without issue.  )      Cardiac   (+) Chronic hypertension   (+) Pre-eclampsia, severe, delivered ([facility])      Pulmonary (within normal limits)      Neuro (within normal limits)      GI (within normal limits)      /Renal (within normal limits)      Liver (within normal limits)      Endocrine (within normal limits)      Hematology (within normal limits)      Musculoskeletal (within normal limits)      HEENT (within normal limits)      ID (within normal limits)      Skin (within normal limits)      GYN   (+) Supervision of other normal pregnancy, antepartum ([facility])       Clinical information reviewed:   Tobacco  Allergies     Surg Hx            NPO Detail:  No data recorded     OB/Gyn Evaluation    Present Pregnancy  Prenatal complications: PDPH s/p epidural x2.   Obstetric History                Physical Exam    Airway  Mallampati: III  TM distance: >3 FB  Neck ROM: full     Cardiovascular    Dental    Pulmonary    Abdominal            Anesthesia Plan    History of general anesthesia?: no  History of complications of general anesthesia?: no    ASA 3     epidural   (EBP)  The patient is not a current smoker.    Anesthetic plan and risks discussed with patient (Patient seen and evaluated.  Risks and benefits of EBP discussed with patient including failure, need for repeat, low back pain.  Questions invited and answered.  Patient  wishes to proceed.  LBL).  Use of blood products discussed with patient who.    Plan discussed with resident and attending.

## 2024-05-15 PROBLEM — O11.9 CHRONIC HYPERTENSION WITH SUPERIMPOSED PREECLAMPSIA (HHS-HCC): Status: ACTIVE | Noted: 2024-05-15

## 2024-05-15 LAB
BLOOD EXPIRATION DATE: NORMAL
DISPENSE STATUS: NORMAL
FETAL MATERNAL SCREEN: NORMAL
PRODUCT BLOOD TYPE: 600
PRODUCT CODE: NORMAL
UNIT ABO: NORMAL
UNIT NUMBER: NORMAL
UNIT RH: NORMAL
UNIT VOLUME: 318
XM INTEP: NORMAL

## 2024-05-15 PROCEDURE — 2500000004 HC RX 250 GENERAL PHARMACY W/ HCPCS (ALT 636 FOR OP/ED)

## 2024-05-15 PROCEDURE — 2500000006 HC RX 250 W HCPCS SELF ADMINISTERED DRUGS (ALT 637 FOR ALL PAYERS)

## 2024-05-15 PROCEDURE — 36415 COLL VENOUS BLD VENIPUNCTURE: CPT

## 2024-05-15 PROCEDURE — 2500000005 HC RX 250 GENERAL PHARMACY W/O HCPCS: Performed by: NURSE PRACTITIONER

## 2024-05-15 PROCEDURE — 2500000006 HC RX 250 W HCPCS SELF ADMINISTERED DRUGS (ALT 637 FOR ALL PAYERS): Performed by: NURSE PRACTITIONER

## 2024-05-15 PROCEDURE — 2500000001 HC RX 250 WO HCPCS SELF ADMINISTERED DRUGS (ALT 637 FOR MEDICARE OP): Performed by: NURSE PRACTITIONER

## 2024-05-15 PROCEDURE — 85461 HEMOGLOBIN FETAL: CPT

## 2024-05-15 PROCEDURE — 1100000001 HC PRIVATE ROOM DAILY

## 2024-05-15 PROCEDURE — 2500000001 HC RX 250 WO HCPCS SELF ADMINISTERED DRUGS (ALT 637 FOR MEDICARE OP)

## 2024-05-15 RX ORDER — NIFEDIPINE 30 MG/1
30 TABLET, FILM COATED, EXTENDED RELEASE ORAL
Status: DISCONTINUED | OUTPATIENT
Start: 2024-05-15 | End: 2024-05-15

## 2024-05-15 RX ORDER — NIFEDIPINE 30 MG/1
30 TABLET, FILM COATED, EXTENDED RELEASE ORAL
Status: DISCONTINUED | OUTPATIENT
Start: 2024-05-15 | End: 2024-05-16 | Stop reason: HOSPADM

## 2024-05-15 RX ORDER — DIPHENHYDRAMINE HCL 25 MG
25 CAPSULE ORAL ONCE
Status: COMPLETED | OUTPATIENT
Start: 2024-05-15 | End: 2024-05-15

## 2024-05-15 RX ORDER — ENOXAPARIN SODIUM 100 MG/ML
80 INJECTION SUBCUTANEOUS EVERY 24 HOURS
Status: DISCONTINUED | OUTPATIENT
Start: 2024-05-15 | End: 2024-05-15

## 2024-05-15 RX ORDER — ENOXAPARIN SODIUM 100 MG/ML
80 INJECTION SUBCUTANEOUS
Status: DISCONTINUED | OUTPATIENT
Start: 2024-05-15 | End: 2024-05-16 | Stop reason: HOSPADM

## 2024-05-15 RX ADMIN — CYCLOBENZAPRINE 10 MG: 10 TABLET, FILM COATED ORAL at 14:48

## 2024-05-15 RX ADMIN — ACETAMINOPHEN 975 MG: 325 TABLET ORAL at 08:09

## 2024-05-15 RX ADMIN — METOCLOPRAMIDE 10 MG: 10 TABLET ORAL at 14:48

## 2024-05-15 RX ADMIN — Medication 1 EACH: at 23:30

## 2024-05-15 RX ADMIN — IBUPROFEN 600 MG: 600 TABLET, FILM COATED ORAL at 20:53

## 2024-05-15 RX ADMIN — NIFEDIPINE 30 MG: 30 TABLET, FILM COATED, EXTENDED RELEASE ORAL at 15:31

## 2024-05-15 RX ADMIN — ACETAMINOPHEN 975 MG: 325 TABLET ORAL at 02:34

## 2024-05-15 RX ADMIN — ACETAMINOPHEN 975 MG: 325 TABLET ORAL at 20:54

## 2024-05-15 RX ADMIN — IBUPROFEN 600 MG: 600 TABLET, FILM COATED ORAL at 02:34

## 2024-05-15 RX ADMIN — SODIUM CHLORIDE, POTASSIUM CHLORIDE, SODIUM LACTATE AND CALCIUM CHLORIDE 1000 ML: 600; 310; 30; 20 INJECTION, SOLUTION INTRAVENOUS at 19:02

## 2024-05-15 RX ADMIN — ACETAMINOPHEN 975 MG: 325 TABLET ORAL at 14:48

## 2024-05-15 RX ADMIN — IBUPROFEN 600 MG: 600 TABLET, FILM COATED ORAL at 14:48

## 2024-05-15 RX ADMIN — DIPHENHYDRAMINE HYDROCHLORIDE 25 MG: 25 CAPSULE ORAL at 19:02

## 2024-05-15 RX ADMIN — ENOXAPARIN SODIUM 80 MG: 100 INJECTION SUBCUTANEOUS at 15:32

## 2024-05-15 RX ADMIN — IBUPROFEN 600 MG: 600 TABLET, FILM COATED ORAL at 08:09

## 2024-05-15 RX ADMIN — NIFEDIPINE 90 MG: 90 TABLET, FILM COATED, EXTENDED RELEASE ORAL at 08:09

## 2024-05-15 ASSESSMENT — PAIN SCALES - GENERAL
PAINLEVEL_OUTOF10: 0 - NO PAIN
PAINLEVEL_OUTOF10: 6
PAINLEVEL_OUTOF10: 6
PAIN_LEVEL: 2
PAINLEVEL_OUTOF10: 5 - MODERATE PAIN
PAINLEVEL_OUTOF10: 6

## 2024-05-15 ASSESSMENT — PAIN SCALES - PAIN ASSESSMENT IN ADVANCED DEMENTIA (PAINAD): TOTALSCORE: MEDICATION (SEE MAR)

## 2024-05-15 ASSESSMENT — PAIN DESCRIPTION - DESCRIPTORS: DESCRIPTORS: HEADACHE

## 2024-05-15 NOTE — CARE PLAN
The patient's goals for the shift include rest and  care    The clinical goals for the shift include WNL vital signs and assessments with a focus on blood pressures      Problem: Postpartum  Goal: Experiences normal postpartum course  Outcome: Progressing  Goal: Minimal s/sx of HDP and BP<160/110  Outcome: Progressing     Problem: Pain - Adult  Goal: Verbalizes/displays adequate comfort level or baseline comfort level  Outcome: Progressing     Problem: Safety - Adult  Goal: Free from fall injury  Outcome: Progressing

## 2024-05-15 NOTE — LACTATION NOTE
Lactation Consultant Note    Recommendations/Summary  Baby has been taking donor milk via bottle every 2-3 hours. Baby is 35 weeks and is under bili lights. Mom had expressed interest in wanting to pump but had not yet begun pumping. Her RN and I talked with mom together and reinforced the need to pump every 2-3 hours for 15-20 mins at a time to help establish a full milk supply while baby is being supplemented with donor milk. Mom expressed understanding and was willing to allow me to assist her with setting up our double electric pump. Mom's nipples measure 19 mm bilaterally so 21 mm flanges are appropriate. Discussed with mom how to order the 21 mm flanges for her pump for at home. Mom has a wearable pump and a double electric pump already for at home. Reviewed pump set up, pump part cleaning, pumping frequency and duration, and safe breast milk storage guidelines. Reviewed typical pumped milk volumes over the first several days postpartum. Mom began pumping while I was in the room and reported that it felt comfortable. Reviewed the outpatient lactation information.

## 2024-05-15 NOTE — ANESTHESIA POSTPROCEDURE EVALUATION
Patient: Melania You    Procedure Summary       Date: 24 Room / Location:     Anesthesia Start: 1110 Anesthesia Stop: 1249    Procedure: Epidural Blood Patch Diagnosis:     Scheduled Providers:  Responsible Provider: Veronica Gómez MD    Anesthesia Type: epidural ASA Status: 3        Melania You is a 28 y.o., , who had a Vaginal, Spontaneous  delivery on 2024  at 35w4d and is now POD2.    She had Neuraxial Anesthesia without immediate complications noted.       Pain well controlled    Vitals:    05/15/24 0732   BP: 128/81   Pulse: 88   Resp: 16   Temp: 37.1 °C (98.8 °F)   SpO2: 96%       Neuraxial site assessed. No visible redness or swelling or drainage. Patient able to ambulate and move all extremities without difficulty. Able to void. No complaints of nausea/vomiting. Tolerating PO intake well. No s/sx of PDPH.     Anesthesia will sign off     Lakeisha Stevenson MD     Pt doing well. Seen sitting up in bed eating breakfast. Endorses improvement in HA, no longer positional or pounding. Mild 2/10 HA remains. Denies any numbness/tingling/back pain. Mild bruising noted at epidural insertion site w/o fluc/erythema.    Anesthesia Type: epidural    Anesthesia Post Evaluation    Patient location during evaluation: floor  Patient participation: complete - patient participated  Level of consciousness: awake and alert  Pain score: 2  Pain management: adequate  Airway patency: patent  Cardiovascular status: acceptable and hemodynamically stable  Respiratory status: acceptable and room air  Hydration status: acceptable  Postoperative Nausea and Vomiting: none        Encounter Notable Events   Notable Event Outcome Phase Comment   Post-dural puncture headache  Intraprocedure

## 2024-05-15 NOTE — PROGRESS NOTES
Postpartum Progress Note    Assessment/Plan   Melania You is a 28 y.o., , who delivered at 35w4d gestation via  after IOL in s/o siPEC w/ SF and is now postpartum day 2.    Now PPD#1 s/p  on ,  mL  - continue routine postpartum care  - pain well controlled on po medications  - DVT Score: 5, for ppx lovenox, restarted after blood patch   - Hgb:   Results from last 7 days   Lab Units 24  0200 24  2111   HEMOGLOBIN g/dL 12.0 13.1   - Follow-up maternal fetal screen, s/p 300 mcg rhogam  2159    siPEC w/ SF  - Dx by sustained severe range BPs requiring IV tx  - cHTN, previously not on meds   - HELLP labs negative x2  - Nifed 60 mg --> 90 mg 515 AM--> 90/30 mg 5/15 PM for persistent mild range BPs  - HA improved after blood patch yesterday, persistent, rated 6/10--> reglan and flexeril--> 10--> nap--> Follow-up response, consider benadryl and fluids  - s/p Mg  - BP cuff for home  - Discussed recommendation for 3 day stay, patient agreeable     PDPH  - s/p blood patch , lovenox resumed > 12 hrs after procedure  - HA improved, but peristent (see above)    Maternal Well-Being  - emotional support provided  - bonding with infant    [place] Feeding  - breastfeeding/pumping encouraged; lactation consult prn    Contraception  - Interval IUD- considering levonorgestrel vs copper IUD  - Provided with bedsider.org resource    Dispo  - Anticipate DC PPD3 pending BP control.  - The signs and symptoms of PEC were reviewed with the patient, including unrelenting headache, vision changes/blurred vision, and pain underneath the right breast.   - BP cuff for home for checking BP BID. Pt instructed to call primary OB if SBP > 160 or DBP > 110.  - On discharge, follow up with primary OB in 2-5 days for BP check and 4-6 weeks for postpartum visit.      Principal Problem:    Labor and delivery indication for care or intervention ([facility])  Active Problems:    Chronic hypertension     Pre-eclampsia, severe, delivered ([facility])    Other complications of spinal and epidural anesthesia during labor and delivery ([facility])    Chronic hypertension with superimposed preeclampsia ([facility])    Pregnancy Problems (from 11/06/23 to present)       Problem Noted Resolved    Other complications of spinal and epidural anesthesia during labor and delivery ([facility]) 5/14/2024 by Veronica Gómez MD No    Priority:  Medium      Labor and delivery indication for care or intervention ([facility]) 5/11/2024 by Mone Rain MD No    Priority:  Medium      Excessive fetal growth affecting management of pregnancy in third trimester ([facility]) 5/8/2024 by Leticia Engel MD No    Priority:  Medium      Overview Signed 5/8/2024  4:18 PM by Leticia Engel MD     -EFW 98% with AC >99% on scan 4/25  -Next scan 5/28         Supervision of other normal pregnancy, antepartum ([facility]) 11/6/2023 by Marta Carmona MD No    Priority:  Medium      Overview Addendum 5/9/2024  3:49 PM by Leticia Engel MD     -s/p flu vaccine 11/6  -will get COVID booster  -risk reducing NIPS  -s/p rhogam and Tdap at nurses visit 3/19  -Breast/considering IUD/Dr. Stanley for peds  [ ] Serial growths, NSTs at 34wga, delivery in 39th week (BMI)    Next Visit  [ ] NST  [ ] GBS  [ ] Review home BPs               Hospital course: postpartum preeclampsia/eclampsia  Vaginal Birth  Patient is currently breastfeedingThe patient's blood type is A NEG. The baby's blood type is A POS . Rhogam indicated and given. Maternal fetal screen pending, follow-up result and administer additional rhogam if indicated.    Subjective   Her pain is well controlled with current medications  She is passing flatus  She is ambulating well  She is tolerating a Adult diet Regular  She reports no breast or nursing problems  She denies emotional concerns today   Her plan for contraception is IUD     Denies SOB, RUQ pain, vision changes. Persistent HA, but improved after blood patch  yesterday.    Objective   Allergies:   Penicillins         Last Vitals:  Temp Pulse Resp BP MAP Pulse Ox   36.8 °C (98.2 °F) 106 18 (!) 141/89   97 %     Vitals Min/Max Last 24 Hours:  Temp  Min: 36.2 °C (97.2 °F)  Max: 37.1 °C (98.8 °F)  Pulse  Min: 88  Max: 111  Resp  Min: 16  Max: 18  BP  Min: 128/81  Max: 148/85    Intake/Output:   No intake or output data in the 24 hours ending 05/15/24 7896    Physical Exam:  General: Examination reveals a well developed, well nourished, female, in no acute distress. She is alert and cooperative.  Lungs: symmetrical, non-labored breathing.  Cardiac: warm, well-perfused.  Abdomen: soft, non-tender.  Fundus: firm, below umbilicus, and nontender.  Extremities: no redness or tenderness in the calves or thighs.  Neurological: alert, oriented, normal speech, no focal findings or movement disorder noted.     Lab Data:  Labs in chart were reviewed.

## 2024-05-16 ENCOUNTER — PHARMACY VISIT (OUTPATIENT)
Dept: PHARMACY | Facility: CLINIC | Age: 29
End: 2024-05-16
Payer: COMMERCIAL

## 2024-05-16 VITALS
RESPIRATION RATE: 18 BRPM | OXYGEN SATURATION: 97 % | SYSTOLIC BLOOD PRESSURE: 134 MMHG | TEMPERATURE: 98.1 F | HEART RATE: 87 BPM | DIASTOLIC BLOOD PRESSURE: 85 MMHG

## 2024-05-16 LAB
LABORATORY COMMENT REPORT: NORMAL
PATH REPORT.FINAL DX SPEC: NORMAL
PATH REPORT.GROSS SPEC: NORMAL
PATH REPORT.RELEVANT HX SPEC: NORMAL
PATH REPORT.TOTAL CANCER: NORMAL

## 2024-05-16 PROCEDURE — 2500000006 HC RX 250 W HCPCS SELF ADMINISTERED DRUGS (ALT 637 FOR ALL PAYERS): Performed by: NURSE PRACTITIONER

## 2024-05-16 PROCEDURE — RXMED WILLOW AMBULATORY MEDICATION CHARGE

## 2024-05-16 PROCEDURE — 2500000001 HC RX 250 WO HCPCS SELF ADMINISTERED DRUGS (ALT 637 FOR MEDICARE OP): Performed by: NURSE PRACTITIONER

## 2024-05-16 RX ORDER — IBUPROFEN 600 MG/1
600 TABLET ORAL EVERY 6 HOURS
Qty: 30 TABLET | Refills: 0 | Status: SHIPPED | OUTPATIENT
Start: 2024-05-16

## 2024-05-16 RX ORDER — NIFEDIPINE 90 MG/1
90 TABLET, EXTENDED RELEASE ORAL
Qty: 30 TABLET | Refills: 3 | Status: SHIPPED | OUTPATIENT
Start: 2024-05-17

## 2024-05-16 RX ORDER — ACETAMINOPHEN 325 MG/1
975 TABLET ORAL EVERY 6 HOURS
Qty: 90 TABLET | Refills: 0 | Status: SHIPPED | OUTPATIENT
Start: 2024-05-16

## 2024-05-16 RX ORDER — CYCLOBENZAPRINE HCL 10 MG
10 TABLET ORAL 3 TIMES DAILY PRN
Qty: 30 TABLET | Refills: 0 | Status: SHIPPED | OUTPATIENT
Start: 2024-05-16

## 2024-05-16 RX ORDER — NIFEDIPINE 30 MG/1
30 TABLET, FILM COATED, EXTENDED RELEASE ORAL
Qty: 30 TABLET | Refills: 3 | Status: SHIPPED | OUTPATIENT
Start: 2024-05-16

## 2024-05-16 RX ADMIN — ACETAMINOPHEN 975 MG: 325 TABLET ORAL at 09:38

## 2024-05-16 RX ADMIN — NIFEDIPINE 90 MG: 90 TABLET, FILM COATED, EXTENDED RELEASE ORAL at 08:24

## 2024-05-16 RX ADMIN — IBUPROFEN 600 MG: 600 TABLET, FILM COATED ORAL at 03:44

## 2024-05-16 RX ADMIN — ACETAMINOPHEN 975 MG: 325 TABLET ORAL at 03:45

## 2024-05-16 RX ADMIN — IBUPROFEN 600 MG: 600 TABLET, FILM COATED ORAL at 09:38

## 2024-05-16 ASSESSMENT — PAIN SCALES - GENERAL
PAINLEVEL_OUTOF10: 4

## 2024-05-16 ASSESSMENT — PAIN DESCRIPTION - DESCRIPTORS: DESCRIPTORS: HEADACHE

## 2024-05-16 ASSESSMENT — PAIN SCALES - PAIN ASSESSMENT IN ADVANCED DEMENTIA (PAINAD): TOTALSCORE: MEDICATION (SEE MAR)

## 2024-05-16 NOTE — CARE PLAN
The patient's goals for the shift include pt will have stable VS    The clinical goals for the shift include pt will be discharged

## 2024-05-16 NOTE — DISCHARGE SUMMARY
Discharge Summary    Admission Date: 5/11/2024  Discharge Date: 05/16/24      Discharge Diagnosis  Pre-eclampsia, severe, delivered ([facility])    Hospital Course  Delivery Date: 5/13/2024  2:37 AM   Delivery type: Vaginal, Spontaneous    GA at delivery: 35w4d  Outcome: Living   Anesthesia during delivery: Epidural   Intrapartum complications: None   Feeding method: Breastfeeding Status: Unknown     Procedures: none  Contraception at discharge: none  States HA tolerable especially when laying down.  Pleasant.  Discussed conservative measures, to return if worsens.  Discussed bp meds and monitoring    Pertinent Physical Exam At Time of Discharge  General: Examination reveals a well developed, well nourished, female, in no acute distress. She is alert and cooperative.  Lungs: symmetrical, non-labored breathing.  Cardiac: warm, well-perfused.  Abdomen: soft, non-tender.  Fundus: firm, at umbilicus, and nontender.  Extremities: no redness or tenderness in the calves or thighs.  Neurological: alert, oriented, normal speech, no focal findings or movement disorder noted.     Discharge Meds     Your medication list        START taking these medications        Instructions Last Dose Given Next Dose Due   cyclobenzaprine 10 mg tablet  Commonly known as: Flexeril      Take 1 tablet (10 mg) by mouth 3 times a day as needed for muscle spasms (headache).       ibuprofen 600 mg tablet      Take 1 tablet (600 mg) by mouth every 6 hours.       NIFEdipine ER 30 mg 24 hr tablet  Commonly known as: Adalat CC      Take 1 tablet (30 mg) by mouth once daily in the evening.  Take before meals. Do not crush, chew, or split.       NIFEdipine ER 90 mg 24 hr tablet  Commonly known as: Adalat CC  Start taking on: May 17, 2024      Take 1 tablet (90 mg) by mouth once daily in the morning. Take before meals. Do not crush, chew, or split.              CHANGE how you take these medications        Instructions Last Dose Given Next Dose Due    acetaminophen 325 mg tablet  Commonly known as: Tylenol  What changed:   medication strength  how much to take  how to take this  when to take this      Take 3 tablets (975 mg) by mouth every 6 hours.              STOP taking these medications      BABY ASPIRIN ORAL        Prenatal Low Iron 27 mg iron- 1 mg tablet  Generic drug: prenatal vit,tiara 74-iron-folic        ZYRTEC ORAL                  Where to Get Your Medications        These medications were sent to [facility]  [address] [place] [id number]      Hours: 8:30 AM to 5 PM Mon-Fri Phone: [phone]   acetaminophen 325 mg tablet  cyclobenzaprine 10 mg tablet  ibuprofen 600 mg tablet  NIFEdipine ER 30 mg 24 hr tablet  NIFEdipine ER 90 mg 24 hr tablet          Complications Requiring Follow-Up  Heavy vaginal bleeding, passing clots, fever and/or chills      Test Results Pending At Discharge  Pending Labs       Order Current Status    Surgical Pathology Exam - PLAC[facility]A In process            Outpatient Follow-Up  Future Appointments   Date Time Provider Department Center   5/20/2024  4:00 PM MD KALYANI Booker [place]   5/28/2024 11:30 AM MD KALYANI Clemons [place]   6/3/2024 11:30 AM MD KALYANI Booker [place]   6/11/2024 11:30 AM MD INES [facility] spent 8 minutes in the professional and overall care of this patient.      Mary Jo Aguero, APRN-CNP

## 2024-05-16 NOTE — LACTATION NOTE
"Lactation Consultant Note  Lactation Consultation  Reason for Consult: Follow-up assessment, Late  infant  Consultant Name: Snehal Perez RN, IBCLC    Maternal Information       Maternal Assessment  Breast Assessment: Medium, Soft, Warm, Compressible  [place]ola Assessment: Normal    Infant Assessment  Infant Assessment:  (deferred)    Feeding Assessment  Nutrition Source: Breastmilk, Donor human milk  Feeding Method: Nursing at the breast, Feeding expressed breastmilk, Paced bottle  Feeding Position: Cradle, Infant not tucked close and facing mother  Suck/Feeding: Sustained, Audible swallowing, Choking/gulping, Coordinated suck/swallow/breathe  Latch Assessment: Minimal assistance is needed, Areolar attachment, Deep latch obtained, Optimal angle of mouth opening, Comfortable latch, Flanged lips, Bursts of sucking, swallowing, and rest, Frequent audible swallows    LATCH TOOL  Latch: Grasps breast, tongue down, lips flanged, rhythmic sucking  Audible Swallowing: Spontaneous and intermittent (24 hours old)  Type of Nipple: Everted (After stimulation)  Comfort (Breast/Nipple): Soft/non-tender  Hold (Positioning): Minimal assist, teach one side, mother does other, staff holds  LATCH Score: 9    Breast Pump  Pump: Hospital grade electric pump  Frequency: 8-10 times per day  Volume of Milk Production: 15  Units of Volume: mL per session    Other OB Lactation Tools       Patient Follow-up  Outpatient Lactation Follow-up: Recommended    Other OB Lactation Documentation  Maternal Risk Factors: Preeclampsia,  delivery <37 weeks  Infant Risk Factors: Prematurity <37 weeks    Recommendations/Summary  Upon entering the room, infant latched to left breast in cradle hold. I noticed infant not all the way turned into mother suggest moving infant a bit closer to mother so she was \"belly to belly\" and chin, shoulders and hips were all aligned. Otherwise infant was latched well with areolar attachment, nose and chin " touching breast, lips flanged, sucks with long jaw movement and audible swallows, almost gulping. Mother states latch is comfortable.     Donor breastmilk was at bedside, mother states she has been supplementing with donor milk after breastfeeding. Mother also pumping every 2-3 hours, most recently pumped 15 ml. I encouraged mother to continue pumping as long as she is supplementing, giving infant her expressed breastmilk first before any other type of supplement, and as her milk increases in volume can potentially just supplement with her own expressed breastmilk or even eliminate eventually. I also educated mother on eeding infant on first breast until she unlatches or until tactile stimulation is not keeping her sucking/swallowing at breast. I then recommended burping infant and then trying to latch/feed infant on other breast. I encouraged follow up with outpatient lactation as needed. Plan for discharge today.

## 2024-05-20 ENCOUNTER — POSTPARTUM VISIT (OUTPATIENT)
Dept: OBSTETRICS AND GYNECOLOGY | Facility: CLINIC | Age: 29
End: 2024-05-20
Payer: COMMERCIAL

## 2024-05-20 VITALS
DIASTOLIC BLOOD PRESSURE: 72 MMHG | BODY MASS INDEX: 45.99 KG/M2 | SYSTOLIC BLOOD PRESSURE: 118 MMHG | HEIGHT: 67 IN | WEIGHT: 293 LBS

## 2024-05-20 PROBLEM — O36.63X0 EXCESSIVE FETAL GROWTH AFFECTING MANAGEMENT OF PREGNANCY IN THIRD TRIMESTER (HHS-HCC): Status: RESOLVED | Noted: 2024-05-08 | Resolved: 2024-05-20

## 2024-05-20 PROBLEM — O11.9 CHRONIC HYPERTENSION WITH SUPERIMPOSED PREECLAMPSIA (HHS-HCC): Status: RESOLVED | Noted: 2024-05-15 | Resolved: 2024-05-20

## 2024-05-20 PROBLEM — Z34.80 SUPERVISION OF OTHER NORMAL PREGNANCY, ANTEPARTUM (HHS-HCC): Status: RESOLVED | Noted: 2023-11-06 | Resolved: 2024-05-20

## 2024-05-20 PROCEDURE — 0503F POSTPARTUM CARE VISIT: CPT | Performed by: OBSTETRICS & GYNECOLOGY

## 2024-05-20 ASSESSMENT — EDINBURGH POSTNATAL DEPRESSION SCALE (EPDS)
I HAVE BEEN SO UNHAPPY THAT I HAVE BEEN CRYING: NO, NEVER
I HAVE BEEN ABLE TO LAUGH AND SEE THE FUNNY SIDE OF THINGS: AS MUCH AS I ALWAYS COULD
I HAVE FELT SAD OR MISERABLE: NO, NOT AT ALL
THE THOUGHT OF HARMING MYSELF HAS OCCURRED TO ME: NEVER
THINGS HAVE BEEN GETTING ON TOP OF ME: NO, I HAVE BEEN COPING AS WELL AS EVER
TOTAL SCORE: 0
I HAVE BEEN ANXIOUS OR WORRIED FOR NO GOOD REASON: NO, NOT AT ALL
I HAVE BLAMED MYSELF UNNECESSARILY WHEN THINGS WENT WRONG: NO, NEVER
I HAVE BEEN SO UNHAPPY THAT I HAVE HAD DIFFICULTY SLEEPING: NOT AT ALL
I HAVE FELT SCARED OR PANICKY FOR NO GOOD REASON: NO, NOT AT ALL
I HAVE LOOKED FORWARD WITH ENJOYMENT TO THINGS: AS MUCH AS I EVER DID

## 2024-05-20 NOTE — PROGRESS NOTES
"Patient presents for 1 week postpartum visit   Delivered vaginally 2024     Postpartum Visit    HPI:  Pt presents for her blood pressure check  s/p  on 24 complicated by siPEC .  She had a baby girl.  She is Breast and bottle feeding. Feeling well overall.  Bps well controlled on procardia xl 90 mg in AM and 30 mg in PM. Denies HA, blurry vision, scotomata, RUQ/epigastric pain.     ROS:  Denies the following: Complains of the following:    - episiotomy site pain   - incisional pain  - incisional drainage  - heavy bleeding  - irregular bleeding  - breast pain  - cracked nipples  - breastfeeding problems  - abdominal pain  - vaginal discharge  - shortness of breath  - chest pain  - back pain  - leg pain  - depression  - suicidal ideation  - nausea  - vomiting  - fever  - pain with urination  - tiredness or fatigue  - headache no pertinent positives        O:  /72 (BP Location: Right arm, Patient Position: Sitting, BP Cuff Size: Large adult)   Ht 1.702 m (5' 7\")   Wt 138 kg (304 lb)   LMP 2023 (Exact Date)   Breastfeeding Yes   BMI 47.61 kg/m²     General Appearance   - consistent with stated age, well groomed and cooperative    Integumentary  - skin warm and dry without rash    Head and Neck  - normalocephalic and neck supple    Chest and Lung Exam  - normal breathing effort, no respiratory distress    A/P:   Pt is a 28 y.o.  who presents for blood pressure check .  Doing well overall postpartum.  Bps well controlled currently on procardia xl 90 mg in AM and 30 mg in PM.  Once getting 110s/60s at home, will stop nighttime 30 mg dose and then wean from there, will call in with any concerns or if she is not sure what to do with bp meds.   Coping well with new baby at home.  [place]  Depression Scale Total: 0.  Breastfeeding going well.  . Baby doing well.  Contraception discussed - planning hormonal IUD. RTC for 6 wk postpartum visit or earlier with any concerns.    Marta " MD Krist[place]

## 2024-05-28 ENCOUNTER — APPOINTMENT (OUTPATIENT)
Dept: RADIOLOGY | Facility: CLINIC | Age: 29
End: 2024-05-28
Payer: COMMERCIAL

## 2024-05-28 ENCOUNTER — APPOINTMENT (OUTPATIENT)
Dept: OBSTETRICS AND GYNECOLOGY | Facility: CLINIC | Age: 29
End: 2024-05-28
Payer: COMMERCIAL

## 2024-05-29 ENCOUNTER — APPOINTMENT (OUTPATIENT)
Dept: OBSTETRICS AND GYNECOLOGY | Facility: CLINIC | Age: 29
End: 2024-05-29
Payer: COMMERCIAL

## 2024-06-03 ENCOUNTER — APPOINTMENT (OUTPATIENT)
Dept: OBSTETRICS AND GYNECOLOGY | Facility: CLINIC | Age: 29
End: 2024-06-03
Payer: COMMERCIAL

## 2024-06-05 ENCOUNTER — APPOINTMENT (OUTPATIENT)
Dept: OBSTETRICS AND GYNECOLOGY | Facility: CLINIC | Age: 29
End: 2024-06-05
Payer: COMMERCIAL

## 2024-06-11 ENCOUNTER — APPOINTMENT (OUTPATIENT)
Dept: OBSTETRICS AND GYNECOLOGY | Facility: CLINIC | Age: 29
End: 2024-06-11
Payer: COMMERCIAL

## 2024-06-12 ENCOUNTER — APPOINTMENT (OUTPATIENT)
Dept: OBSTETRICS AND GYNECOLOGY | Facility: CLINIC | Age: 29
End: 2024-06-12
Payer: COMMERCIAL

## 2024-06-24 NOTE — PROGRESS NOTES
"Patient presents for a 6 week postpartum visit  Delivered vaginally 2024  Last PAP 2023 NEG HPV-    Postpartum Visit    HPI:  Pt presents for her 6 wk postpartum visit s/p  on 24 complicated by siPEC .  She had a baby girl.  She is Breast and bottle feeding. Feeling well overall.  Bps well controlled on procardia xl 90 mg daily.     ROS:  Denies the following: Complains of the following:    - episiotomy site pain   - incisional pain  - incisional drainage  - heavy bleeding  - irregular bleeding  - breast pain  - cracked nipples  - breastfeeding problems  - abdominal pain  - vaginal discharge  - shortness of breath  - chest pain  - back pain  - leg pain  - depression  - suicidal ideation  - nausea  - vomiting  - fever  - pain with urination  - tiredness or fatigue  - headache no pertinent positives        O:  /83 (BP Location: Left arm, Patient Position: Sitting, BP Cuff Size: Large adult)   Pulse 109   Ht 1.702 m (5' 7\")   Wt 139 kg (306 lb 4 oz)   LMP  (LMP Unknown)   SpO2 99%   Breastfeeding Yes   BMI 47.97 kg/m²     General Appearance   - consistent with stated age, well groomed and cooperative    Integumentary  - skin warm and dry without rash    Head and Neck  - normalocephalic and neck supple    Chest and Lung Exam  - normal breathing effort, no respiratory distress    Pelvic  - normal external genitalia, laceration well healed    A/P:   Pt is a 28 y.o.  who presents for 6 wk postpartum visit.  Doing well overall postpartum.  Bps well controlled currently on procardia xl 90 mg daily, will follow up with her PCP as discussed she may need to stay on meds.   Coping well with new baby at home.  [place]  Depression Scale Total: 0.  Breastfeeding going well.  . Baby doing well.  Contraception discussed - planning hormonal IUD. RTC for mirena IUD insertion.     Marta Carmona MD    "
Fellow

## 2024-06-25 ENCOUNTER — APPOINTMENT (OUTPATIENT)
Dept: OBSTETRICS AND GYNECOLOGY | Facility: CLINIC | Age: 29
End: 2024-06-25
Payer: COMMERCIAL

## 2024-06-25 VITALS
OXYGEN SATURATION: 99 % | BODY MASS INDEX: 45.99 KG/M2 | DIASTOLIC BLOOD PRESSURE: 83 MMHG | HEIGHT: 67 IN | WEIGHT: 293 LBS | SYSTOLIC BLOOD PRESSURE: 127 MMHG | HEART RATE: 109 BPM

## 2024-06-25 PROCEDURE — 0503F POSTPARTUM CARE VISIT: CPT | Performed by: OBSTETRICS & GYNECOLOGY

## 2024-06-25 ASSESSMENT — EDINBURGH POSTNATAL DEPRESSION SCALE (EPDS)
I HAVE FELT SCARED OR PANICKY FOR NO GOOD REASON: NO, NOT AT ALL
TOTAL SCORE: 0
I HAVE LOOKED FORWARD WITH ENJOYMENT TO THINGS: AS MUCH AS I EVER DID
I HAVE BEEN SO UNHAPPY THAT I HAVE BEEN CRYING: NO, NEVER
I HAVE BEEN ABLE TO LAUGH AND SEE THE FUNNY SIDE OF THINGS: AS MUCH AS I ALWAYS COULD
THINGS HAVE BEEN GETTING ON TOP OF ME: NO, I HAVE BEEN COPING AS WELL AS EVER
THE THOUGHT OF HARMING MYSELF HAS OCCURRED TO ME: NEVER
I HAVE FELT SAD OR MISERABLE: NO, NOT AT ALL
I HAVE BEEN SO UNHAPPY THAT I HAVE HAD DIFFICULTY SLEEPING: NOT AT ALL
I HAVE BLAMED MYSELF UNNECESSARILY WHEN THINGS WENT WRONG: NO, NEVER
I HAVE BEEN ANXIOUS OR WORRIED FOR NO GOOD REASON: NO, NOT AT ALL

## 2024-07-19 ENCOUNTER — APPOINTMENT (OUTPATIENT)
Dept: OBSTETRICS AND GYNECOLOGY | Facility: CLINIC | Age: 29
End: 2024-07-19
Payer: COMMERCIAL

## 2024-08-02 ENCOUNTER — APPOINTMENT (OUTPATIENT)
Dept: OBSTETRICS AND GYNECOLOGY | Facility: CLINIC | Age: 29
End: 2024-08-02
Payer: COMMERCIAL

## 2024-08-09 ENCOUNTER — APPOINTMENT (OUTPATIENT)
Dept: OBSTETRICS AND GYNECOLOGY | Facility: CLINIC | Age: 29
End: 2024-08-09
Payer: COMMERCIAL

## 2024-08-09 VITALS — BODY MASS INDEX: 47.3 KG/M2 | DIASTOLIC BLOOD PRESSURE: 82 MMHG | SYSTOLIC BLOOD PRESSURE: 123 MMHG | WEIGHT: 293 LBS

## 2024-08-09 DIAGNOSIS — Z11.3 SCREENING FOR STDS (SEXUALLY TRANSMITTED DISEASES): ICD-10-CM

## 2024-08-09 DIAGNOSIS — Z30.430 ENCOUNTER FOR INSERTION OF MIRENA IUD: Primary | ICD-10-CM

## 2024-08-09 LAB — PREGNANCY TEST URINE, POC: NEGATIVE

## 2024-08-09 PROCEDURE — 87491 CHLMYD TRACH DNA AMP PROBE: CPT

## 2024-08-09 PROCEDURE — 87591 N.GONORRHOEAE DNA AMP PROB: CPT

## 2024-08-09 PROCEDURE — 87661 TRICHOMONAS VAGINALIS AMPLIF: CPT

## 2024-08-09 RX ORDER — LEVONORGESTREL 52 MG/1
1 INTRAUTERINE DEVICE INTRAUTERINE ONCE
COMMUNITY
Start: 2024-08-09

## 2024-08-09 ASSESSMENT — EDINBURGH POSTNATAL DEPRESSION SCALE (EPDS)
I HAVE BEEN SO UNHAPPY THAT I HAVE BEEN CRYING: NO, NEVER
I HAVE BEEN ABLE TO LAUGH AND SEE THE FUNNY SIDE OF THINGS: AS MUCH AS I ALWAYS COULD
I HAVE LOOKED FORWARD WITH ENJOYMENT TO THINGS: AS MUCH AS I EVER DID
I HAVE FELT SAD OR MISERABLE: NO, NOT AT ALL
I HAVE BEEN SO UNHAPPY THAT I HAVE HAD DIFFICULTY SLEEPING: NOT AT ALL
I HAVE FELT SCARED OR PANICKY FOR NO GOOD REASON: NO, NOT AT ALL
I HAVE BEEN ANXIOUS OR WORRIED FOR NO GOOD REASON: NO, NOT AT ALL
TOTAL SCORE: 0
THINGS HAVE BEEN GETTING ON TOP OF ME: NO, I HAVE BEEN COPING AS WELL AS EVER
THE THOUGHT OF HARMING MYSELF HAS OCCURRED TO ME: NEVER
I HAVE BLAMED MYSELF UNNECESSARILY WHEN THINGS WENT WRONG: NO, NEVER

## 2024-08-09 NOTE — PROGRESS NOTES
Patient ID: Melania You is a 28 y.o. female.    IUD Insertion    Performed by: KORTNEY Tang  Authorized by: KORTNEY Tang    Procedure: IUD insertion    Consent obtained by patient, parent, or legal power of  - including discussion of procedure risks and benefits, patient questions answered, and patient education provided: yes    Pregnancy risk: reasonably certain the patient is not pregnant    Pelvic exam performed: yes    Speculum placed in vagina: yes    Cervix cleaned and prepped: yes    Tenaculum/Allis/Ring Forceps applied to cervix: yes    Uterus sound depth (cm):  8.5  Cervix manually dilated: no    IUD inserted without complications: yes    OSM: levonorgestrel 21 mcg/24 hr (8 yrs) 52 mg  Patient tolerated procedure well: yes    Intended removal date: 8 years      Condoms for STI prevention  Warning s/s discussed  All questions answered    KORTNEY Tang  RTC 6wks for string check

## 2024-08-10 LAB
C TRACH RRNA SPEC QL NAA+PROBE: NEGATIVE
N GONORRHOEA DNA SPEC QL PROBE+SIG AMP: NEGATIVE
T VAGINALIS RRNA SPEC QL NAA+PROBE: NEGATIVE

## 2024-09-20 ENCOUNTER — APPOINTMENT (OUTPATIENT)
Dept: OBSTETRICS AND GYNECOLOGY | Facility: CLINIC | Age: 29
End: 2024-09-20
Payer: COMMERCIAL

## 2024-09-25 ENCOUNTER — APPOINTMENT (OUTPATIENT)
Dept: OBSTETRICS AND GYNECOLOGY | Facility: CLINIC | Age: 29
End: 2024-09-25
Payer: COMMERCIAL

## 2024-09-25 VITALS — BODY MASS INDEX: 48.36 KG/M2 | DIASTOLIC BLOOD PRESSURE: 76 MMHG | SYSTOLIC BLOOD PRESSURE: 120 MMHG | WEIGHT: 293 LBS

## 2024-09-25 DIAGNOSIS — Z30.431 ENCOUNTER FOR ROUTINE CHECKING OF INTRAUTERINE CONTRACEPTIVE DEVICE (IUD): Primary | ICD-10-CM

## 2024-09-25 PROCEDURE — 99212 OFFICE O/P EST SF 10 MIN: CPT | Performed by: ADVANCED PRACTICE MIDWIFE

## 2024-09-25 PROCEDURE — 3078F DIAST BP <80 MM HG: CPT | Performed by: ADVANCED PRACTICE MIDWIFE

## 2024-09-25 PROCEDURE — 3074F SYST BP LT 130 MM HG: CPT | Performed by: ADVANCED PRACTICE MIDWIFE

## 2024-09-25 NOTE — PROGRESS NOTES
GYNECOLOGY PROGRESS NOTE        CC:  IUD string check  Chief Complaint   Patient presents with    Follow-up     Est pt for string check   Got mirena inserted 8/9/24  No concerns        HPI:  28 y.o female presents for routine string check s/p placement 8/9/24. Pt denies adverse side effects. Reports not having a period yet. Some spotting here and there.       ROS:  GYN - see HPI        PHYSICAL EXAM:  /76 (BP Location: Left arm, Patient Position: Sitting, BP Cuff Size: Adult)   Wt 140 kg (308 lb 12.8 oz)   BMI 48.36 kg/m²   GEN:  A&O, NAD  GYN:  normal vulva and perineum w/o lesions or ulcers,  no lesions, normal cervix without lesions. minimal bleeding noted at the cervix,   LYMPH:  no inguinal lymphadenopathy  PSYCH:  normal affect, non-anxious      IMPRESSION/PLAN:  A: blue strings noted on exam. minimal bleeding noted at the cervix. Has not had a period since starting Mirena.  P: perform self string checks. Follow-up if needed.   Problem List Items Addressed This Visit    None  Visit Diagnoses       Encounter for routine checking of intrauterine contraceptive device (IUD)    -  Primary          Follow-up in 1 year or sooner as needed.       Olga Knutson RN    I saw and evaluated the patient. I personally obtained the key and critical portions of the history and physical exam or was physically present for key and critical portions performed by the student. I reviewed the student's documentation and discussed the patient with the student. I agree with the student's medical decision making as documented in the note.      Marcia Gupta CNM

## 2024-10-15 ENCOUNTER — APPOINTMENT (OUTPATIENT)
Dept: PRIMARY CARE | Facility: CLINIC | Age: 29
End: 2024-10-15
Payer: COMMERCIAL

## 2024-10-15 VITALS
BODY MASS INDEX: 45.99 KG/M2 | TEMPERATURE: 98.4 F | HEIGHT: 67 IN | DIASTOLIC BLOOD PRESSURE: 64 MMHG | WEIGHT: 293 LBS | OXYGEN SATURATION: 97 % | HEART RATE: 99 BPM | SYSTOLIC BLOOD PRESSURE: 124 MMHG

## 2024-10-15 DIAGNOSIS — M54.42 CHRONIC LEFT-SIDED LOW BACK PAIN WITH LEFT-SIDED SCIATICA: Primary | ICD-10-CM

## 2024-10-15 DIAGNOSIS — G89.29 CHRONIC LEFT-SIDED LOW BACK PAIN WITH LEFT-SIDED SCIATICA: Primary | ICD-10-CM

## 2024-10-15 DIAGNOSIS — O11.9 CHRONIC HYPERTENSION WITH SUPERIMPOSED PREECLAMPSIA (HHS-HCC): ICD-10-CM

## 2024-10-15 PROCEDURE — 3074F SYST BP LT 130 MM HG: CPT

## 2024-10-15 PROCEDURE — 3008F BODY MASS INDEX DOCD: CPT

## 2024-10-15 PROCEDURE — 3078F DIAST BP <80 MM HG: CPT

## 2024-10-15 PROCEDURE — 99203 OFFICE O/P NEW LOW 30 MIN: CPT

## 2024-10-15 PROCEDURE — 1036F TOBACCO NON-USER: CPT

## 2024-10-15 RX ORDER — IBUPROFEN 600 MG/1
600 TABLET ORAL EVERY 6 HOURS
Qty: 30 TABLET | Refills: 0 | Status: SHIPPED | OUTPATIENT
Start: 2024-10-15

## 2024-10-15 RX ORDER — NIFEDIPINE 90 MG/1
90 TABLET, EXTENDED RELEASE ORAL
Qty: 30 TABLET | Refills: 3 | Status: SHIPPED | OUTPATIENT
Start: 2024-10-15

## 2024-10-15 ASSESSMENT — PATIENT HEALTH QUESTIONNAIRE - PHQ9
1. LITTLE INTEREST OR PLEASURE IN DOING THINGS: NOT AT ALL
2. FEELING DOWN, DEPRESSED OR HOPELESS: NOT AT ALL
SUM OF ALL RESPONSES TO PHQ9 QUESTIONS 1 AND 2: 0

## 2024-10-15 NOTE — PROGRESS NOTES
"Subjective   Patient ID: Melania You is a 28 y.o. female who presents for Med Refill (Patient is in office today for medication refill ) and Back Pain (Low back pain x 1 month. Patient advises that it feels like her sciatic nerve pain. ).    HPI     28-year-old female with history of hypertension, presented today for medication refill.  Patient has had her baby 5 months ago and she was started on nifedipine 90 mg daily for management of hypertension and superimposed preeclampsia.  She also complains of lower back pain on the left side radiates to the left leg which comes and goes and is not bad today.  Patient did have epidural anesthesia during her labor and was complicated by spinal headache requiring blood patch.  Otherwise she has no symptoms currently, she has no chest pain, no shortness of breath, no cough,No lower limb swelling, no abdominal pain, no urinary symptoms      Review of Systems  As above  Objective   /64 (BP Location: Left arm, Patient Position: Sitting, BP Cuff Size: Large adult)   Pulse 99   Temp 36.9 °C (98.4 °F) (Temporal)   Ht 1.702 m (5' 7\")   Wt 140 kg (309 lb 9.6 oz)   SpO2 97% Comment: RA  Breastfeeding Yes   BMI 48.49 kg/m²     Physical Exam  General: Awake, alert/oriented x4, well developed, no acute distress  Head: Atraumatic/Normocephalic  Eyes: Normal external exam, EOMI, PERRLA  ENT: Oropharynx normal. Uvula midline, no tonsillar edema, moist mucous membranes  Cardiovascular: RRR, S1/S2, no murmurs, rubs, or gallops, radial pulses +2, no edema of extremities  Pulmonary: CTAB, no respiratory distress. No wheezes, rales, or ronchi  Abdomen: +BS, soft, non-tender, nondistended, no guarding or rebound, no masses noted  MSK: No joint swelling, normal movements of all extremities. Range of motion- normal.  Extremities: FROM, no edema, wounds, or contusions  Skin- No lesions, contusions, or erythema.  Neuro: Alert/oriented x4, no focal motor or sensory " deficits  Psychiatric: Judgment intact. Appropriate mood and behavior     Assessment/Plan       - Lower back pain  Patient has been taking Tylenol with no much improvement, she was advised to take ibuprofen, ibuprofen was refilled.  -She was also handed lower back pain exercise sheet    -HTN:  -Hx of preeclampsia:  -Blood pressure is 124/64, refilled nifedipine 90 mg daily

## 2025-02-09 DIAGNOSIS — O11.9 CHRONIC HYPERTENSION WITH SUPERIMPOSED PREECLAMPSIA (HHS-HCC): ICD-10-CM

## 2025-02-10 RX ORDER — NIFEDIPINE 90 MG/1
90 TABLET, EXTENDED RELEASE ORAL
Qty: 90 TABLET | Refills: 1 | Status: SHIPPED | OUTPATIENT
Start: 2025-02-10 | End: 2025-02-11 | Stop reason: SDUPTHER

## 2025-02-11 ENCOUNTER — OFFICE VISIT (OUTPATIENT)
Dept: PRIMARY CARE | Facility: CLINIC | Age: 30
End: 2025-02-11
Payer: COMMERCIAL

## 2025-02-11 VITALS
TEMPERATURE: 97.5 F | SYSTOLIC BLOOD PRESSURE: 134 MMHG | HEIGHT: 68 IN | WEIGHT: 293 LBS | DIASTOLIC BLOOD PRESSURE: 82 MMHG | BODY MASS INDEX: 44.41 KG/M2 | OXYGEN SATURATION: 97 % | HEART RATE: 97 BPM

## 2025-02-11 DIAGNOSIS — G89.29 CHRONIC LEFT-SIDED LOW BACK PAIN WITH LEFT-SIDED SCIATICA: Primary | ICD-10-CM

## 2025-02-11 DIAGNOSIS — E66.01 MORBID OBESITY (MULTI): ICD-10-CM

## 2025-02-11 DIAGNOSIS — M54.42 CHRONIC LEFT-SIDED LOW BACK PAIN WITH LEFT-SIDED SCIATICA: Primary | ICD-10-CM

## 2025-02-11 DIAGNOSIS — O11.9 CHRONIC HYPERTENSION WITH SUPERIMPOSED PREECLAMPSIA (HHS-HCC): ICD-10-CM

## 2025-02-11 PROCEDURE — 3075F SYST BP GE 130 - 139MM HG: CPT

## 2025-02-11 PROCEDURE — 90656 IIV3 VACC NO PRSV 0.5 ML IM: CPT

## 2025-02-11 PROCEDURE — 3008F BODY MASS INDEX DOCD: CPT

## 2025-02-11 PROCEDURE — 3079F DIAST BP 80-89 MM HG: CPT

## 2025-02-11 PROCEDURE — 99213 OFFICE O/P EST LOW 20 MIN: CPT

## 2025-02-11 PROCEDURE — 1036F TOBACCO NON-USER: CPT

## 2025-02-11 PROCEDURE — 90471 IMMUNIZATION ADMIN: CPT

## 2025-02-11 RX ORDER — NIFEDIPINE 90 MG/1
90 TABLET, EXTENDED RELEASE ORAL
Qty: 90 TABLET | Refills: 1 | Status: SHIPPED | OUTPATIENT
Start: 2025-02-11

## 2025-02-11 ASSESSMENT — PATIENT HEALTH QUESTIONNAIRE - PHQ9
2. FEELING DOWN, DEPRESSED OR HOPELESS: NOT AT ALL
1. LITTLE INTEREST OR PLEASURE IN DOING THINGS: NOT AT ALL
SUM OF ALL RESPONSES TO PHQ9 QUESTIONS 1 AND 2: 0

## 2025-02-11 NOTE — PROGRESS NOTES
"Subjective   Patient ID: Melania You is a 29 y.o. female who presents for Med Refill.    HPI     29 year-old female with history of hypertension, presented today for medication refill.  Patient has had her baby 9 months ago and she was started on nifedipine 90 mg daily for management of hypertension and superimposed preeclampsia.  She also complained lower back pain on the left side radiates to the left leg which has improved since her last visit    Patient did have epidural anesthesia during her labor and was complicated by spinal headache requiring blood patch.    Otherwise she has no symptoms currently, she has no chest pain, no shortness of breath, no cough,No lower limb swelling, no abdominal pain, no urinary symptoms      Review of Systems  As above  Objective   /82 (BP Location: Left arm, Patient Position: Sitting, BP Cuff Size: Adult)   Pulse 97   Temp 36.4 °C (97.5 °F) (Temporal)   Ht 1.727 m (5' 8\")   Wt 144 kg (318 lb 6.4 oz)   SpO2 97% Comment: RA  BMI 48.41 kg/m²     Physical Exam  General: Awake, alert/oriented x4, well developed, no acute distress  Head: Atraumatic/Normocephalic  Eyes: Normal external exam, EOMI, PERRLA  ENT: Oropharynx normal. Uvula midline, no tonsillar edema, moist mucous membranes  Cardiovascular: RRR, S1/S2, no murmurs, rubs, or gallops, radial pulses +2, no edema of extremities  Pulmonary: CTAB, no respiratory distress. No wheezes, rales, or ronchi  Abdomen: +BS, soft, non-tender, nondistended, no guarding or rebound, no masses noted  MSK: No joint swelling, normal movements of all extremities. Range of motion- normal.  Extremities: FROM, no edema, wounds, or contusions  Skin- No lesions, contusions, or erythema.  Neuro: Alert/oriented x4, no focal motor or sensory deficits  Psychiatric: Judgment intact. Appropriate mood and behavior     Assessment/Plan       - Lower back pain  Resolved    -HTN:  -Hx of preeclampsia:  -Morbid obesity  -Blood pressure is 134/82 " refilled nifedipine 90 mg daily  -Discussed with the patient diet including salt intake discussed with her exercise will patient stated that she will try watching her diet and she will start exercising.

## 2025-04-19 ENCOUNTER — HOSPITAL ENCOUNTER (OUTPATIENT)
Facility: HOSPITAL | Age: 30
Setting detail: OBSERVATION
End: 2025-04-19
Attending: EMERGENCY MEDICINE | Admitting: INTERNAL MEDICINE
Payer: COMMERCIAL

## 2025-04-19 DIAGNOSIS — N61.0 MASTITIS: ICD-10-CM

## 2025-04-19 DIAGNOSIS — A41.9 SEPSIS WITHOUT ACUTE ORGAN DYSFUNCTION, DUE TO UNSPECIFIED ORGANISM (MULTI): Primary | ICD-10-CM

## 2025-04-19 LAB
BASOPHILS # BLD AUTO: 0.06 X10*3/UL (ref 0–0.1)
BASOPHILS NFR BLD AUTO: 0.4 %
EOSINOPHIL # BLD AUTO: 0.62 X10*3/UL (ref 0–0.7)
EOSINOPHIL NFR BLD AUTO: 3.8 %
ERYTHROCYTE [DISTWIDTH] IN BLOOD BY AUTOMATED COUNT: 13.2 % (ref 11.5–14.5)
HCT VFR BLD AUTO: 40.7 % (ref 36–46)
HGB BLD-MCNC: 13.7 G/DL (ref 12–16)
IMM GRANULOCYTES # BLD AUTO: 0.07 X10*3/UL (ref 0–0.7)
IMM GRANULOCYTES NFR BLD AUTO: 0.4 % (ref 0–0.9)
LYMPHOCYTES # BLD AUTO: 3.01 X10*3/UL (ref 1.2–4.8)
LYMPHOCYTES NFR BLD AUTO: 18.4 %
MCH RBC QN AUTO: 30.2 PG (ref 26–34)
MCHC RBC AUTO-ENTMCNC: 33.7 G/DL (ref 32–36)
MCV RBC AUTO: 90 FL (ref 80–100)
MONOCYTES # BLD AUTO: 0.68 X10*3/UL (ref 0.1–1)
MONOCYTES NFR BLD AUTO: 4.1 %
NEUTROPHILS # BLD AUTO: 11.96 X10*3/UL (ref 1.2–7.7)
NEUTROPHILS NFR BLD AUTO: 72.9 %
NRBC BLD-RTO: 0 /100 WBCS (ref 0–0)
PLATELET # BLD AUTO: 373 X10*3/UL (ref 150–450)
RBC # BLD AUTO: 4.53 X10*6/UL (ref 4–5.2)
WBC # BLD AUTO: 16.4 X10*3/UL (ref 4.4–11.3)

## 2025-04-19 PROCEDURE — 2500000004 HC RX 250 GENERAL PHARMACY W/ HCPCS (ALT 636 FOR OP/ED): Mod: JZ

## 2025-04-19 PROCEDURE — 84702 CHORIONIC GONADOTROPIN TEST: CPT | Performed by: NURSE PRACTITIONER

## 2025-04-19 PROCEDURE — 36415 COLL VENOUS BLD VENIPUNCTURE: CPT

## 2025-04-19 PROCEDURE — 85025 COMPLETE CBC W/AUTO DIFF WBC: CPT

## 2025-04-19 PROCEDURE — 99285 EMERGENCY DEPT VISIT HI MDM: CPT | Performed by: EMERGENCY MEDICINE

## 2025-04-19 PROCEDURE — 83735 ASSAY OF MAGNESIUM: CPT | Performed by: NURSE PRACTITIONER

## 2025-04-19 PROCEDURE — 87040 BLOOD CULTURE FOR BACTERIA: CPT | Mod: STJLAB

## 2025-04-19 PROCEDURE — 83036 HEMOGLOBIN GLYCOSYLATED A1C: CPT | Mod: STJLAB | Performed by: NURSE PRACTITIONER

## 2025-04-19 PROCEDURE — 83605 ASSAY OF LACTIC ACID: CPT

## 2025-04-19 PROCEDURE — 2500000001 HC RX 250 WO HCPCS SELF ADMINISTERED DRUGS (ALT 637 FOR MEDICARE OP)

## 2025-04-19 PROCEDURE — 96361 HYDRATE IV INFUSION ADD-ON: CPT

## 2025-04-19 PROCEDURE — 80053 COMPREHEN METABOLIC PANEL: CPT

## 2025-04-19 RX ORDER — CEFTRIAXONE 2 G/50ML
2 INJECTION, SOLUTION INTRAVENOUS ONCE
Status: COMPLETED | OUTPATIENT
Start: 2025-04-19 | End: 2025-04-20

## 2025-04-19 RX ORDER — VANCOMYCIN 2 GRAM/500 ML IN 0.9 % SODIUM CHLORIDE INTRAVENOUS
2000 ONCE
Status: COMPLETED | OUTPATIENT
Start: 2025-04-19 | End: 2025-04-20

## 2025-04-19 RX ORDER — VANCOMYCIN HYDROCHLORIDE 1 G/20ML
INJECTION, POWDER, LYOPHILIZED, FOR SOLUTION INTRAVENOUS DAILY PRN
Status: DISCONTINUED | OUTPATIENT
Start: 2025-04-19 | End: 2025-04-19

## 2025-04-19 RX ORDER — ACETAMINOPHEN 325 MG/1
975 TABLET ORAL ONCE
Status: COMPLETED | OUTPATIENT
Start: 2025-04-19 | End: 2025-04-19

## 2025-04-19 RX ADMIN — ACETAMINOPHEN 975 MG: 325 TABLET ORAL at 23:41

## 2025-04-19 RX ADMIN — SODIUM CHLORIDE, SODIUM LACTATE, POTASSIUM CHLORIDE, AND CALCIUM CHLORIDE 1000 ML: .6; .31; .03; .02 INJECTION, SOLUTION INTRAVENOUS at 23:40

## 2025-04-19 ASSESSMENT — LIFESTYLE VARIABLES
EVER HAD A DRINK FIRST THING IN THE MORNING TO STEADY YOUR NERVES TO GET RID OF A HANGOVER: NO
TOTAL SCORE: 0
HAVE YOU EVER FELT YOU SHOULD CUT DOWN ON YOUR DRINKING: NO
EVER FELT BAD OR GUILTY ABOUT YOUR DRINKING: NO
HAVE PEOPLE ANNOYED YOU BY CRITICIZING YOUR DRINKING: NO

## 2025-04-19 ASSESSMENT — PAIN - FUNCTIONAL ASSESSMENT: PAIN_FUNCTIONAL_ASSESSMENT: 0-10

## 2025-04-19 ASSESSMENT — COLUMBIA-SUICIDE SEVERITY RATING SCALE - C-SSRS
1. IN THE PAST MONTH, HAVE YOU WISHED YOU WERE DEAD OR WISHED YOU COULD GO TO SLEEP AND NOT WAKE UP?: NO
6. HAVE YOU EVER DONE ANYTHING, STARTED TO DO ANYTHING, OR PREPARED TO DO ANYTHING TO END YOUR LIFE?: NO
2. HAVE YOU ACTUALLY HAD ANY THOUGHTS OF KILLING YOURSELF?: NO

## 2025-04-19 ASSESSMENT — PAIN SCALES - GENERAL
PAINLEVEL_OUTOF10: 0 - NO PAIN
PAINLEVEL_OUTOF10: 10 - WORST POSSIBLE PAIN

## 2025-04-20 ENCOUNTER — APPOINTMENT (OUTPATIENT)
Dept: RADIOLOGY | Facility: HOSPITAL | Age: 30
End: 2025-04-20
Payer: COMMERCIAL

## 2025-04-20 VITALS
RESPIRATION RATE: 16 BRPM | HEART RATE: 101 BPM | BODY MASS INDEX: 45.99 KG/M2 | TEMPERATURE: 98.4 F | HEIGHT: 67 IN | OXYGEN SATURATION: 100 % | SYSTOLIC BLOOD PRESSURE: 142 MMHG | WEIGHT: 293 LBS | DIASTOLIC BLOOD PRESSURE: 66 MMHG

## 2025-04-20 PROBLEM — E87.6 HYPOKALEMIA: Status: ACTIVE | Noted: 2025-04-20

## 2025-04-20 PROBLEM — Z78.9 FAILURE OF OUTPATIENT TREATMENT: Status: ACTIVE | Noted: 2025-04-20

## 2025-04-20 PROBLEM — N61.0 MASTITIS: Status: ACTIVE | Noted: 2025-04-20

## 2025-04-20 PROBLEM — I10 CHRONIC HYPERTENSION: Status: RESOLVED | Noted: 2023-11-03 | Resolved: 2025-04-20

## 2025-04-20 PROBLEM — E66.01 OBESITY, MORBID, BMI 40.0-49.9 (MULTI): Status: RESOLVED | Noted: 2023-11-03 | Resolved: 2025-04-20

## 2025-04-20 LAB
ALBUMIN SERPL BCP-MCNC: 4.8 G/DL (ref 3.4–5)
ALP SERPL-CCNC: 87 U/L (ref 33–110)
ALT SERPL W P-5'-P-CCNC: 41 U/L (ref 7–45)
ANION GAP SERPL CALC-SCNC: 13 MMOL/L (ref 10–20)
ANION GAP SERPL CALC-SCNC: 15 MMOL/L (ref 10–20)
AST SERPL W P-5'-P-CCNC: 19 U/L (ref 9–39)
B-HCG SERPL-ACNC: 3 MIU/ML
BACTERIA BLD CULT: NORMAL
BACTERIA BLD CULT: NORMAL
BILIRUB SERPL-MCNC: 0.5 MG/DL (ref 0–1.2)
BUN SERPL-MCNC: 14 MG/DL (ref 6–23)
BUN SERPL-MCNC: 8 MG/DL (ref 6–23)
CALCIUM SERPL-MCNC: 8.7 MG/DL (ref 8.6–10.3)
CALCIUM SERPL-MCNC: 9.5 MG/DL (ref 8.6–10.3)
CHLORIDE SERPL-SCNC: 102 MMOL/L (ref 98–107)
CHLORIDE SERPL-SCNC: 108 MMOL/L (ref 98–107)
CO2 SERPL-SCNC: 22 MMOL/L (ref 21–32)
CO2 SERPL-SCNC: 24 MMOL/L (ref 21–32)
CREAT SERPL-MCNC: 0.44 MG/DL (ref 0.5–1.05)
CREAT SERPL-MCNC: 0.62 MG/DL (ref 0.5–1.05)
EGFRCR SERPLBLD CKD-EPI 2021: >90 ML/MIN/1.73M*2
EGFRCR SERPLBLD CKD-EPI 2021: >90 ML/MIN/1.73M*2
ERYTHROCYTE [DISTWIDTH] IN BLOOD BY AUTOMATED COUNT: 13.3 % (ref 11.5–14.5)
EST. AVERAGE GLUCOSE BLD GHB EST-MCNC: 100 MG/DL
GLUCOSE SERPL-MCNC: 107 MG/DL (ref 74–99)
GLUCOSE SERPL-MCNC: 144 MG/DL (ref 74–99)
HBA1C MFR BLD: 5.1 % (ref ?–5.7)
HCT VFR BLD AUTO: 37 % (ref 36–46)
HGB BLD-MCNC: 12.3 G/DL (ref 12–16)
HOLD SPECIMEN: NORMAL
LACTATE SERPL-SCNC: 0.7 MMOL/L (ref 0.4–2)
MAGNESIUM SERPL-MCNC: 2.26 MG/DL (ref 1.6–2.4)
MCH RBC QN AUTO: 30.1 PG (ref 26–34)
MCHC RBC AUTO-ENTMCNC: 33.2 G/DL (ref 32–36)
MCV RBC AUTO: 91 FL (ref 80–100)
NRBC BLD-RTO: 0 /100 WBCS (ref 0–0)
PLATELET # BLD AUTO: 312 X10*3/UL (ref 150–450)
POTASSIUM SERPL-SCNC: 3.3 MMOL/L (ref 3.5–5.3)
POTASSIUM SERPL-SCNC: 3.4 MMOL/L (ref 3.5–5.3)
PROT SERPL-MCNC: 8.3 G/DL (ref 6.4–8.2)
RBC # BLD AUTO: 4.09 X10*6/UL (ref 4–5.2)
SODIUM SERPL-SCNC: 138 MMOL/L (ref 136–145)
SODIUM SERPL-SCNC: 140 MMOL/L (ref 136–145)
WBC # BLD AUTO: 11.2 X10*3/UL (ref 4.4–11.3)

## 2025-04-20 PROCEDURE — 36415 COLL VENOUS BLD VENIPUNCTURE: CPT

## 2025-04-20 PROCEDURE — 2500000001 HC RX 250 WO HCPCS SELF ADMINISTERED DRUGS (ALT 637 FOR MEDICARE OP): Performed by: NURSE PRACTITIONER

## 2025-04-20 PROCEDURE — 87081 CULTURE SCREEN ONLY: CPT | Mod: STJLAB | Performed by: NURSE PRACTITIONER

## 2025-04-20 PROCEDURE — 2500000004 HC RX 250 GENERAL PHARMACY W/ HCPCS (ALT 636 FOR OP/ED): Mod: JZ

## 2025-04-20 PROCEDURE — 96367 TX/PROPH/DG ADDL SEQ IV INF: CPT

## 2025-04-20 PROCEDURE — 96365 THER/PROPH/DIAG IV INF INIT: CPT

## 2025-04-20 PROCEDURE — 71250 CT THORAX DX C-: CPT | Performed by: RADIOLOGY

## 2025-04-20 PROCEDURE — G0378 HOSPITAL OBSERVATION PER HR: HCPCS

## 2025-04-20 PROCEDURE — 85027 COMPLETE CBC AUTOMATED: CPT | Performed by: NURSE PRACTITIONER

## 2025-04-20 PROCEDURE — 36415 COLL VENOUS BLD VENIPUNCTURE: CPT | Performed by: NURSE PRACTITIONER

## 2025-04-20 PROCEDURE — 87040 BLOOD CULTURE FOR BACTERIA: CPT | Mod: STJLAB

## 2025-04-20 PROCEDURE — 2500000002 HC RX 250 W HCPCS SELF ADMINISTERED DRUGS (ALT 637 FOR MEDICARE OP, ALT 636 FOR OP/ED): Performed by: NURSE PRACTITIONER

## 2025-04-20 PROCEDURE — 96366 THER/PROPH/DIAG IV INF ADDON: CPT

## 2025-04-20 PROCEDURE — 2500000004 HC RX 250 GENERAL PHARMACY W/ HCPCS (ALT 636 FOR OP/ED): Mod: JZ | Performed by: NURSE PRACTITIONER

## 2025-04-20 PROCEDURE — 96361 HYDRATE IV INFUSION ADD-ON: CPT

## 2025-04-20 PROCEDURE — 80048 BASIC METABOLIC PNL TOTAL CA: CPT | Performed by: NURSE PRACTITIONER

## 2025-04-20 PROCEDURE — 71250 CT THORAX DX C-: CPT

## 2025-04-20 PROCEDURE — 87075 CULTR BACTERIA EXCEPT BLOOD: CPT | Mod: STJLAB | Performed by: NURSE PRACTITIONER

## 2025-04-20 RX ORDER — ACETAMINOPHEN 650 MG/1
650 SUPPOSITORY RECTAL EVERY 4 HOURS PRN
Status: DISCONTINUED | OUTPATIENT
Start: 2025-04-20 | End: 2025-04-20

## 2025-04-20 RX ORDER — POTASSIUM CHLORIDE 20 MEQ/1
40 TABLET, EXTENDED RELEASE ORAL ONCE
Status: COMPLETED | OUTPATIENT
Start: 2025-04-20 | End: 2025-04-20

## 2025-04-20 RX ORDER — CEFTRIAXONE 2 G/50ML
2 INJECTION, SOLUTION INTRAVENOUS EVERY 24 HOURS
Status: DISCONTINUED | OUTPATIENT
Start: 2025-04-21 | End: 2025-04-21 | Stop reason: HOSPADM

## 2025-04-20 RX ORDER — L. ACIDOPHILUS/L.BULGARICUS 1MM CELL
1 TABLET ORAL 2 TIMES DAILY
Status: DISCONTINUED | OUTPATIENT
Start: 2025-04-20 | End: 2025-04-21 | Stop reason: HOSPADM

## 2025-04-20 RX ORDER — VANCOMYCIN 2 GRAM/500 ML IN 0.9 % SODIUM CHLORIDE INTRAVENOUS
2000 EVERY 8 HOURS
Status: DISCONTINUED | OUTPATIENT
Start: 2025-04-20 | End: 2025-04-21 | Stop reason: HOSPADM

## 2025-04-20 RX ORDER — SODIUM CHLORIDE, SODIUM LACTATE, POTASSIUM CHLORIDE, CALCIUM CHLORIDE 600; 310; 30; 20 MG/100ML; MG/100ML; MG/100ML; MG/100ML
100 INJECTION, SOLUTION INTRAVENOUS CONTINUOUS
Status: ACTIVE | OUTPATIENT
Start: 2025-04-20 | End: 2025-04-20

## 2025-04-20 RX ORDER — ACETAMINOPHEN 160 MG/5ML
650 SOLUTION ORAL EVERY 4 HOURS PRN
Status: DISCONTINUED | OUTPATIENT
Start: 2025-04-20 | End: 2025-04-20

## 2025-04-20 RX ORDER — ONDANSETRON 4 MG/1
4 TABLET, ORALLY DISINTEGRATING ORAL EVERY 8 HOURS PRN
Status: DISCONTINUED | OUTPATIENT
Start: 2025-04-20 | End: 2025-04-21 | Stop reason: HOSPADM

## 2025-04-20 RX ORDER — IBUPROFEN 400 MG/1
400 TABLET ORAL EVERY 6 HOURS PRN
Status: DISCONTINUED | OUTPATIENT
Start: 2025-04-20 | End: 2025-04-21 | Stop reason: HOSPADM

## 2025-04-20 RX ORDER — VANCOMYCIN HYDROCHLORIDE 1 G/20ML
INJECTION, POWDER, LYOPHILIZED, FOR SOLUTION INTRAVENOUS DAILY PRN
Status: DISCONTINUED | OUTPATIENT
Start: 2025-04-20 | End: 2025-04-21 | Stop reason: HOSPADM

## 2025-04-20 RX ORDER — ACETAMINOPHEN 325 MG/1
650 TABLET ORAL EVERY 4 HOURS PRN
Status: DISCONTINUED | OUTPATIENT
Start: 2025-04-20 | End: 2025-04-20

## 2025-04-20 RX ORDER — POLYETHYLENE GLYCOL 3350 17 G/17G
17 POWDER, FOR SOLUTION ORAL DAILY PRN
Status: DISCONTINUED | OUTPATIENT
Start: 2025-04-20 | End: 2025-04-21 | Stop reason: HOSPADM

## 2025-04-20 RX ORDER — SODIUM CHLORIDE 0.9 % (FLUSH) 0.9 %
10 SYRINGE (ML) INJECTION EVERY 8 HOURS SCHEDULED
Status: DISCONTINUED | OUTPATIENT
Start: 2025-04-20 | End: 2025-04-21 | Stop reason: HOSPADM

## 2025-04-20 RX ORDER — TALC
3 POWDER (GRAM) TOPICAL NIGHTLY PRN
Status: DISCONTINUED | OUTPATIENT
Start: 2025-04-20 | End: 2025-04-21 | Stop reason: HOSPADM

## 2025-04-20 RX ORDER — ONDANSETRON HYDROCHLORIDE 2 MG/ML
4 INJECTION, SOLUTION INTRAVENOUS EVERY 8 HOURS PRN
Status: DISCONTINUED | OUTPATIENT
Start: 2025-04-20 | End: 2025-04-21 | Stop reason: HOSPADM

## 2025-04-20 RX ADMIN — Medication 2000 MG: at 01:14

## 2025-04-20 RX ADMIN — Medication 1 TABLET: at 20:51

## 2025-04-20 RX ADMIN — Medication 1 TABLET: at 08:51

## 2025-04-20 RX ADMIN — NIFEDIPINE 90 MG: 60 TABLET, FILM COATED, EXTENDED RELEASE ORAL at 06:51

## 2025-04-20 RX ADMIN — POTASSIUM CHLORIDE 40 MEQ: 1500 TABLET, EXTENDED RELEASE ORAL at 12:45

## 2025-04-20 RX ADMIN — SODIUM CHLORIDE, SODIUM LACTATE, POTASSIUM CHLORIDE, AND CALCIUM CHLORIDE 100 ML/HR: .6; .31; .03; .02 INJECTION, SOLUTION INTRAVENOUS at 19:01

## 2025-04-20 RX ADMIN — Medication 2000 MG: at 23:52

## 2025-04-20 RX ADMIN — SODIUM CHLORIDE, SODIUM LACTATE, POTASSIUM CHLORIDE, AND CALCIUM CHLORIDE 100 ML/HR: .6; .31; .03; .02 INJECTION, SOLUTION INTRAVENOUS at 03:28

## 2025-04-20 RX ADMIN — SODIUM CHLORIDE, SODIUM LACTATE, POTASSIUM CHLORIDE, AND CALCIUM CHLORIDE 1000 ML: .6; .31; .03; .02 INJECTION, SOLUTION INTRAVENOUS at 02:00

## 2025-04-20 RX ADMIN — Medication 2000 MG: at 15:19

## 2025-04-20 RX ADMIN — Medication 10 ML: at 23:42

## 2025-04-20 RX ADMIN — Medication 10 ML: at 05:29

## 2025-04-20 RX ADMIN — POTASSIUM CHLORIDE 40 MEQ: 1500 TABLET, EXTENDED RELEASE ORAL at 00:50

## 2025-04-20 RX ADMIN — IBUPROFEN 400 MG: 400 TABLET, FILM COATED ORAL at 16:22

## 2025-04-20 RX ADMIN — CEFTRIAXONE SODIUM 2 G: 2 INJECTION, SOLUTION INTRAVENOUS at 00:50

## 2025-04-20 RX ADMIN — Medication 1 TABLET: at 03:23

## 2025-04-20 SDOH — ECONOMIC STABILITY: FOOD INSECURITY: WITHIN THE PAST 12 MONTHS, THE FOOD YOU BOUGHT JUST DIDN'T LAST AND YOU DIDN'T HAVE MONEY TO GET MORE.: NEVER TRUE

## 2025-04-20 SDOH — ECONOMIC STABILITY: FOOD INSECURITY: WITHIN THE PAST 12 MONTHS, YOU WORRIED THAT YOUR FOOD WOULD RUN OUT BEFORE YOU GOT THE MONEY TO BUY MORE.: NEVER TRUE

## 2025-04-20 SDOH — SOCIAL STABILITY: SOCIAL NETWORK
DO YOU BELONG TO ANY CLUBS OR ORGANIZATIONS SUCH AS CHURCH GROUPS, UNIONS, FRATERNAL OR ATHLETIC GROUPS, OR SCHOOL GROUPS?: NO

## 2025-04-20 SDOH — SOCIAL STABILITY: SOCIAL INSECURITY
WITHIN THE LAST YEAR, HAVE YOU BEEN KICKED, HIT, SLAPPED, OR OTHERWISE PHYSICALLY HURT BY YOUR PARTNER OR EX-PARTNER?: NO

## 2025-04-20 SDOH — HEALTH STABILITY: PHYSICAL HEALTH: ON AVERAGE, HOW MANY DAYS PER WEEK DO YOU ENGAGE IN MODERATE TO STRENUOUS EXERCISE (LIKE A BRISK WALK)?: 0 DAYS

## 2025-04-20 SDOH — SOCIAL STABILITY: SOCIAL INSECURITY: DOES ANYONE TRY TO KEEP YOU FROM HAVING/CONTACTING OTHER FRIENDS OR DOING THINGS OUTSIDE YOUR HOME?: NO

## 2025-04-20 SDOH — SOCIAL STABILITY: SOCIAL INSECURITY: WITHIN THE LAST YEAR, HAVE YOU BEEN AFRAID OF YOUR PARTNER OR EX-PARTNER?: NO

## 2025-04-20 SDOH — HEALTH STABILITY: PHYSICAL HEALTH
HOW OFTEN DO YOU NEED TO HAVE SOMEONE HELP YOU WHEN YOU READ INSTRUCTIONS, PAMPHLETS, OR OTHER WRITTEN MATERIAL FROM YOUR DOCTOR OR PHARMACY?: NEVER

## 2025-04-20 SDOH — HEALTH STABILITY: MENTAL HEALTH
DO YOU FEEL STRESS - TENSE, RESTLESS, NERVOUS, OR ANXIOUS, OR UNABLE TO SLEEP AT NIGHT BECAUSE YOUR MIND IS TROUBLED ALL THE TIME - THESE DAYS?: NOT AT ALL

## 2025-04-20 SDOH — SOCIAL STABILITY: SOCIAL INSECURITY: WITHIN THE LAST YEAR, HAVE YOU BEEN HUMILIATED OR EMOTIONALLY ABUSED IN OTHER WAYS BY YOUR PARTNER OR EX-PARTNER?: NO

## 2025-04-20 SDOH — SOCIAL STABILITY: SOCIAL INSECURITY: ARE YOU MARRIED, WIDOWED, DIVORCED, SEPARATED, NEVER MARRIED, OR LIVING WITH A PARTNER?: MARRIED

## 2025-04-20 SDOH — ECONOMIC STABILITY: TRANSPORTATION INSECURITY: IN THE PAST 12 MONTHS, HAS LACK OF TRANSPORTATION KEPT YOU FROM MEDICAL APPOINTMENTS OR FROM GETTING MEDICATIONS?: NO

## 2025-04-20 SDOH — SOCIAL STABILITY: SOCIAL NETWORK: HOW OFTEN DO YOU ATTEND MEETINGS OF THE CLUBS OR ORGANIZATIONS YOU BELONG TO?: NEVER

## 2025-04-20 SDOH — SOCIAL STABILITY: SOCIAL NETWORK: HOW OFTEN DO YOU GET TOGETHER WITH FRIENDS OR RELATIVES?: ONCE A WEEK

## 2025-04-20 SDOH — SOCIAL STABILITY: SOCIAL NETWORK: HOW OFTEN DO YOU ATTEND CHURCH OR RELIGIOUS SERVICES?: NEVER

## 2025-04-20 SDOH — SOCIAL STABILITY: SOCIAL INSECURITY: ABUSE: ADULT

## 2025-04-20 SDOH — SOCIAL STABILITY: SOCIAL INSECURITY: HAVE YOU HAD THOUGHTS OF HARMING ANYONE ELSE?: NO

## 2025-04-20 SDOH — HEALTH STABILITY: PHYSICAL HEALTH: ON AVERAGE, HOW MANY MINUTES DO YOU ENGAGE IN EXERCISE AT THIS LEVEL?: 0 MIN

## 2025-04-20 SDOH — SOCIAL STABILITY: SOCIAL INSECURITY: HAVE YOU HAD ANY THOUGHTS OF HARMING ANYONE ELSE?: NO

## 2025-04-20 SDOH — SOCIAL STABILITY: SOCIAL INSECURITY
WITHIN THE LAST YEAR, HAVE YOU BEEN RAPED OR FORCED TO HAVE ANY KIND OF SEXUAL ACTIVITY BY YOUR PARTNER OR EX-PARTNER?: NO

## 2025-04-20 SDOH — SOCIAL STABILITY: SOCIAL NETWORK
IN A TYPICAL WEEK, HOW MANY TIMES DO YOU TALK ON THE PHONE WITH FAMILY, FRIENDS, OR NEIGHBORS?: MORE THAN THREE TIMES A WEEK

## 2025-04-20 SDOH — SOCIAL STABILITY: SOCIAL INSECURITY: DO YOU FEEL UNSAFE GOING BACK TO THE PLACE WHERE YOU ARE LIVING?: NO

## 2025-04-20 SDOH — SOCIAL STABILITY: SOCIAL INSECURITY: ARE THERE ANY APPARENT SIGNS OF INJURIES/BEHAVIORS THAT COULD BE RELATED TO ABUSE/NEGLECT?: NO

## 2025-04-20 SDOH — ECONOMIC STABILITY: FOOD INSECURITY: HOW HARD IS IT FOR YOU TO PAY FOR THE VERY BASICS LIKE FOOD, HOUSING, MEDICAL CARE, AND HEATING?: NOT HARD AT ALL

## 2025-04-20 SDOH — SOCIAL STABILITY: SOCIAL INSECURITY: HAS ANYONE EVER THREATENED TO HURT YOUR FAMILY OR YOUR PETS?: NO

## 2025-04-20 SDOH — SOCIAL STABILITY: SOCIAL INSECURITY: POSSIBLE ABUSE REPORTED TO:: OTHER (COMMENT)

## 2025-04-20 SDOH — SOCIAL STABILITY: SOCIAL INSECURITY: DO YOU FEEL ANYONE HAS EXPLOITED OR TAKEN ADVANTAGE OF YOU FINANCIALLY OR OF YOUR PERSONAL PROPERTY?: NO

## 2025-04-20 SDOH — HEALTH STABILITY: MENTAL HEALTH: EXPERIENCED ANY OF THE FOLLOWING LIFE EVENTS: DEATH OF FAMILY/FRIEND

## 2025-04-20 SDOH — SOCIAL STABILITY: SOCIAL INSECURITY: WERE YOU ABLE TO COMPLETE ALL THE BEHAVIORAL HEALTH SCREENINGS?: YES

## 2025-04-20 SDOH — SOCIAL STABILITY: SOCIAL INSECURITY: ARE YOU OR HAVE YOU BEEN THREATENED OR ABUSED PHYSICALLY, EMOTIONALLY, OR SEXUALLY BY ANYONE?: NO

## 2025-04-20 ASSESSMENT — ENCOUNTER SYMPTOMS
CHILLS: 1
NECK STIFFNESS: 0
TROUBLE SWALLOWING: 0
DYSURIA: 0
ABDOMINAL PAIN: 0
VOMITING: 0
NAUSEA: 0
COUGH: 0
FATIGUE: 1
CONFUSION: 0
FEVER: 1
DIZZINESS: 0
COLOR CHANGE: 1
HEADACHES: 0
HEMATURIA: 0
NECK PAIN: 0
SHORTNESS OF BREATH: 0
CHEST TIGHTNESS: 0
PALPITATIONS: 0
WOUND: 0
FLANK PAIN: 0

## 2025-04-20 ASSESSMENT — PAIN DESCRIPTION - LOCATION: LOCATION: HEAD

## 2025-04-20 ASSESSMENT — COGNITIVE AND FUNCTIONAL STATUS - GENERAL
TOILETING: A LITTLE
STANDING UP FROM CHAIR USING ARMS: A LITTLE
DRESSING REGULAR UPPER BODY CLOTHING: A LITTLE
DAILY ACTIVITIY SCORE: 22
DAILY ACTIVITIY SCORE: 24
WALKING IN HOSPITAL ROOM: A LITTLE
CLIMB 3 TO 5 STEPS WITH RAILING: A LITTLE
MOBILITY SCORE: 24
MOBILITY SCORE: 21
DAILY ACTIVITIY SCORE: 24
MOBILITY SCORE: 24
PATIENT BASELINE BEDBOUND: NO

## 2025-04-20 ASSESSMENT — ACTIVITIES OF DAILY LIVING (ADL)
FEEDING YOURSELF: INDEPENDENT
LACK_OF_TRANSPORTATION: NO
HEARING - LEFT EAR: FUNCTIONAL
TOILETING: INDEPENDENT
BATHING: INDEPENDENT
PATIENT'S MEMORY ADEQUATE TO SAFELY COMPLETE DAILY ACTIVITIES?: YES
JUDGMENT_ADEQUATE_SAFELY_COMPLETE_DAILY_ACTIVITIES: YES
ADEQUATE_TO_COMPLETE_ADL: YES
GROOMING: INDEPENDENT
HEARING - RIGHT EAR: FUNCTIONAL
WALKS IN HOME: INDEPENDENT
DRESSING YOURSELF: INDEPENDENT

## 2025-04-20 ASSESSMENT — LIFESTYLE VARIABLES
AUDIT-C TOTAL SCORE: 0
AUDIT-C TOTAL SCORE: 0
SUBSTANCE_ABUSE_PAST_12_MONTHS: NO
SKIP TO QUESTIONS 9-10: 1
HOW OFTEN DO YOU HAVE A DRINK CONTAINING ALCOHOL: NEVER
HOW MANY STANDARD DRINKS CONTAINING ALCOHOL DO YOU HAVE ON A TYPICAL DAY: PATIENT DOES NOT DRINK
HOW OFTEN DO YOU HAVE 6 OR MORE DRINKS ON ONE OCCASION: NEVER
PRESCIPTION_ABUSE_PAST_12_MONTHS: NO

## 2025-04-20 ASSESSMENT — PAIN SCALES - GENERAL
PAINLEVEL_OUTOF10: 0 - NO PAIN
PAINLEVEL_OUTOF10: 2
PAINLEVEL_OUTOF10: 4
PAINLEVEL_OUTOF10: 0 - NO PAIN

## 2025-04-20 ASSESSMENT — PATIENT HEALTH QUESTIONNAIRE - PHQ9
1. LITTLE INTEREST OR PLEASURE IN DOING THINGS: NOT AT ALL
SUM OF ALL RESPONSES TO PHQ9 QUESTIONS 1 & 2: 0
2. FEELING DOWN, DEPRESSED OR HOPELESS: NOT AT ALL

## 2025-04-20 NOTE — CONSULTS
Obstetrical Consult    Reason for Consult: mastitis and breastfeeding    Assessment/Plan    Mastitis failed oral therapy, r/o abscess   - continuing to breastfeed/pump, ok to resume wean after resolution of infection   - recommend local ice therapy and NSAIDs for pain (currently Tylenol PRN ordered)   - afebrile, vitally normal   - agree with current empiric abx s/p Vancomycin > Ceftriaxone 2g every day; notes improvement since arrival with this regimen   - s/p pharm consult, MRSA swab pending   - breast US pending     cHTN s/p SIpreE   - continue home meds, management per primary     Subjective   Patient notes she has had left breast pain and an area in the upper inner quadrant of left breast that was more tender starting about 1 week ago. She was seen outpatient at [facility] urgent care on  and started on oral Cefdinir with some improvement. Some mile temp elevations and chills at home, no true fevers documented. No other infective symptoms other than breast symptoms. On presentation to ED last night she had one remaining tab of Cefdinir. At that time breast was dark red extended to marked borders with purpling of the nipple. Some relief at home with icing. Upon re-eval this morning redness less intense and receded in area.   Has been breastfeeding a little less as baby now 11 months old and notes that left breast is usually more productive than right.   No vaginal bleeding or gyn complaints, has IUD in place.     Obstetrical History   OB History    Para Term  AB Living   4 2 0 2 2 2   SAB IAB Ectopic Multiple Live Births   2 0 0 0 2      # Outcome Date GA Lbr Jaspreet/2nd Weight Sex Type Anes PTL Lv   4  24 35w4d / 00:09 3.68 kg F Vag-Spont EPI  MARIAM   3  21 34w2d  2.778 kg M   Y MARIAM   2 SAB  7w0d          1 SAB  7w0d              Past Medical History  Medical History[1]     Past Surgical History   Surgical History[2]    Social History  Social History     Tobacco Use    Smoking  status: Never     Passive exposure: Past    Smokeless tobacco: Never   Substance Use Topics    Alcohol use: Not Currently     Substance and Sexual Activity   Drug Use Never       Allergies  Penicillins     Medications  Prescriptions Prior to Admission[3]    Objective    Last Vitals  Temp Pulse Resp BP MAP O2 Sat   36.9 °C (98.4 °F) 100 18 (!) 149/106 (RN Notified/ SUSANA APONTE AWARE OF THIS NO ORDERS RECEIVED. SAID PT. WILL GET HER BPMEDS IN AM.) 124 100 %     Physical Examination  GENERAL: Examination reveals a well developed, well nourished and obese, gravid female in no acute distress and whose affect is appropriate. She is alert and cooperative.  HEENT: PERRLA. External ears normal. Nose normal, no erythema or discharge. Mouth and throat clear.  LUNGS: comfortable on room air  BREASTS: normal right breast, left breast with mild diffuse erythema however receded within marked borders in area and mild erythema of the nipple, some firmness in the upper inner quadrant of left breast without fluctuation  ABDOMEN: obese, soft nTND  EXTREMITIES: no redness or tenderness in the calves or thighs, no edema  SKIN: no other rashes or lesions, see breast exam   NEUROLOGICAL: alert, oriented, normal speech, no focal findings or movement disorder noted  PSYCHOLOGICAL: awake and alert; oriented to person, place, and time    Lab Review  Labs in chart were reviewed.         [1]   Past Medical History:  Diagnosis Date    Chronic low back pain with left-sided sciatica     History of gestational diabetes     History of pre-eclampsia     Hypertension     Morbid obesity with BMI of 45.0-49.9, adult (Multi)    [2]   Past Surgical History:  Procedure Laterality Date    EPIDURAL BLOOD PATCH      EPIDURAL CATHETER INSERTION      INTRAUTERINE DEVICE INSERTION     [3]   Medications Prior to Admission   Medication Sig Dispense Refill Last Dose/Taking    acetaminophen (Tylenol) 325 mg tablet Take 3 tablets (975 mg) by mouth every 6  hours. (Patient not taking: Reported on 2/11/2025) 90 tablet 0     ibuprofen 600 mg tablet Take 1 tablet (600 mg) by mouth every 6 hours. (Patient not taking: Reported on 2/11/2025) 30 tablet 0     levonorgestrel (Mirena) 21 mcg/24 hr (8 yrs) 52 mg IUD 52 mg by intrauterine route 1 time.       NIFEdipine ER (NIFEdipine XL) 90 mg 24 hr tablet Take 1 tablet (90 mg) by mouth once daily in the morning. Take before meals. DO NOT CRUSH CHEW OR SPLIT 90 tablet 1

## 2025-04-20 NOTE — ED PROVIDER NOTES
EMERGENCY DEPARTMENT ENCOUNTER      Pt Name: Melania You  MRN: [id number]  Birthdate 1995  Date of evaluation: 4/19/2025  Provider: Devyn Aranda MD    CHIEF COMPLAINT       Chief Complaint   Patient presents with    Breast Problem     Pt states she is currently breastfeeding and since Thursday  pts left breast has started to become painful, red and hard to the touch. Pt states she was seen at urgent care on 4/13 for mastitis and were given oral antibiotics and she states the redness began to clear up but started to reappear. Pts L breast his red and hot to the touch. Pt states she had fever/chills yesterday but has been taking tylenol and has not had either today.      HISTORY OF PRESENT ILLNESS    HPI  29-year-old female comes emergency department chief complaint of mastitis.  Patient claims that she has had painful erythematous breast since the 13th.  She claims that she went to urgent care and they gave her cefdinir.  Over the past 24 hours the erythema and pain has gotten worse.  She indicates that she was taking the antibiotics appropriately.  She also endorses subjective fevers and chills.  She has continued breast-feeding.  Nursing Notes were reviewed.    PAST MEDICAL HISTORY   Medical History[1]      SURGICAL HISTORY     Surgical History[2]      CURRENT MEDICATIONS       Previous Medications    ACETAMINOPHEN (TYLENOL) 325 MG TABLET    Take 3 tablets (975 mg) by mouth every 6 hours.    IBUPROFEN 600 MG TABLET    Take 1 tablet (600 mg) by mouth every 6 hours.    LEVONORGESTREL (MIRENA) 21 MCG/24 HR (8 YRS) 52 MG IUD    52 mg by intrauterine route 1 time.    NIFEDIPINE ER (NIFEDIPINE XL) 90 MG 24 HR TABLET    Take 1 tablet (90 mg) by mouth once daily in the morning. Take before meals. DO NOT CRUSH CHEW OR SPLIT       ALLERGIES     Penicillins    FAMILY HISTORY     Family History[3]       SOCIAL HISTORY     Social History[4]    SCREENINGS                        PHYSICAL EXAM    (up to 7 for level 4, 8  or more for level 5)     ED Triage Vitals [04/19/25 2301]   Temperature Heart Rate Respirations BP   36.3 °C (97.3 °F) (!) 119 20 (!) 138/103      Pulse Ox Temp src Heart Rate Source Patient Position   100 % -- -- --      BP Location FiO2 (%)     -- --       Physical Exam  Constitutional:       Appearance: Normal appearance.   HENT:      Head: Normocephalic and atraumatic.      Nose: Nose normal.      Mouth/Throat:      Mouth: Mucous membranes are moist.      Pharynx: Oropharynx is clear.   Eyes:      Extraocular Movements: Extraocular movements intact.      Pupils: Pupils are equal, round, and reactive to light.   Cardiovascular:      Rate and Rhythm: Normal rate and regular rhythm.      Pulses: Normal pulses.      Heart sounds: Normal heart sounds.   Pulmonary:      Effort: Pulmonary effort is normal.      Breath sounds: Normal breath sounds.   Chest:          Comments: Erythema of the breast no axillary lymphadenopathy was palpable.  No nipple discharge.  Abdominal:      General: Abdomen is flat.   Skin:     General: Skin is warm.      Capillary Refill: Capillary refill takes less than 2 seconds.   Neurological:      General: No focal deficit present.      Mental Status: She is alert.   Psychiatric:         Mood and Affect: Mood normal.          DIAGNOSTIC RESULTS     LABS:  Labs Reviewed   CBC WITH AUTO DIFFERENTIAL - Abnormal       Result Value    WBC 16.4 (*)     nRBC 0.0      RBC 4.53      Hemoglobin 13.7      Hematocrit 40.7      MCV 90      MCH 30.2      MCHC 33.7      RDW 13.2      Platelets 373      Neutrophils % 72.9      Immature Granulocytes %, Automated 0.4      Lymphocytes % 18.4      Monocytes % 4.1      Eosinophils % 3.8      Basophils % 0.4      Neutrophils Absolute 11.96 (*)     Immature Granulocytes Absolute, Automated 0.07      Lymphocytes Absolute 3.01      Monocytes Absolute 0.68      Eosinophils Absolute 0.62      Basophils Absolute 0.06     COMPREHENSIVE METABOLIC PANEL - Abnormal     Glucose 144 (*)     Sodium 138      Potassium 3.3 (*)     Chloride 102      Bicarbonate 24      Anion Gap 15      Urea Nitrogen 14      Creatinine 0.62      eGFR >90      Calcium 9.5      Albumin 4.8      Alkaline Phosphatase 87      Total Protein 8.3 (*)     AST 19      Bilirubin, Total 0.5      ALT 41     LACTATE - Normal    Lactate 0.7      Narrative:     Venipuncture immediately after or during the administration of Metamizole may lead to falsely low results. Testing should be performed immediately prior to Metamizole dosing.   BLOOD CULTURE   BLOOD CULTURE   STAPHYLOCOCCUS AUREUS/MRSA COLONIZATION, CULTURE   HUMAN CHORIONIC GONADOTROPIN, SERUM QUANTITATIVE   MAGNESIUM       All other labs were within normal range or not returned as of this dictation.    Imaging  No orders to display        Procedures  Procedures     EMERGENCY DEPARTMENT COURSE/MDM:   Medical Decision Making  29-year-old female presents emergency department chief complaint of breast problem.  Medical management treatment Emergency Department will be to evaluate standard labs.  We will also give the patient antibiotics here Rocephin and vancomycin.  Patient's labs remarkable for elevated white blood cell count.  This is concerning for possible sepsis.  Patient did fail outpatient biotic treatment.  We will admit the patient to the hospital service for IV antibiotics and management.    ED Course as of 04/20/25 0015   Sat Apr 19, 2025   8910 29-year-old female presenting to the emergency department.  She developed mastitis 4 days ago.  She was seen at outpatient provider started on cefdinir which she has been taking twice a day for the past 4 days.  Initially had improved and then 2 days ago started with increased pain and redness.  The erythema has more than doubled in size over the past 24 hours now starting to extend into the chest wall.  She had some subjective fevers and chills yesterday.  On arrival she is afebrile tachycardic otherwise  normotensive.  Given my concern for sepsis with her systemic signs of infection and tachycardia IV established and septic workup was initiated.  We did order broad-spectrum antibiotics for her.  She is noted to have leukocytosis of 16,000.    Patient currently meeting criteria for sepsis without septic shock.  Given her sepsis despite being on appropriate outpatient antibiotic therapy decision was made to admit the patient to the hospital for further evaluation management. [JJ]      ED Course User Index  [JJ] Walter LOZA Lucas, DO         Diagnoses as of 04/20/25 0015   Sepsis without acute organ dysfunction, due to unspecified organism (Multi)   Mastitis        Patient and or family in agreement and understanding of treatment plan.  All questions answered.      I reviewed the case with the attending ED physician. The attending ED physician agrees with the plan. Patient and/or patient´s representative was counseled regarding labs, imaging, likely diagnosis, and plan. All questions were answered.    ED Medications administered this visit:    Medications   lactated Ringer's bolus 1,000 mL (1,000 mL intravenous New Bag 4/19/25 2340)   cefTRIAXone (Rocephin) 2 g in dextrose (iso) IV 50 mL (has no administration in time range)   vancomycin (Vancocin) 2,000 mg in sodium chloride 0.9%  mL (has no administration in time range)   potassium chloride CR (Klor-Con M20) ER tablet 40 mEq (has no administration in time range)   acetaminophen (Tylenol) tablet 975 mg (975 mg oral Given 4/19/25 2341)       New Prescriptions from this visit:    New Prescriptions    No medications on file       Follow-up:  No follow-up provider specified.      Final Impression:   1. Sepsis without acute organ dysfunction, due to unspecified organism (Multi)    2. Mastitis          (Please note that portions of this note were completed with a voice recognition program.  Efforts were made to edit the dictations but occasionally words are  mis-transcribed.)       [1]   Past Medical History:  Diagnosis Date    Gestational diabetes     Hypertension affecting pregnancy ([facility])     Obesity complicating pregnancy, unspecified trimester ([facility]) 05/24/2021    Obesity in pregnancy, antepartum    Supervision of other high risk pregnancies, unspecified trimester ([facility]) 05/24/2021    Susceptible to varicella (non-immune), currently pregnant    Unspecified maternal hypertension, unspecified trimester ([facility]) 06/14/2021    Hypertension affecting pregnancy   [2]   Past Surgical History:  Procedure Laterality Date    OTHER SURGICAL HISTORY  12/21/2020    No history of surgery   [3]   Family History  Problem Relation Name Age of Onset    Hypertension Mother      Diabetes Mother      Hypertension Father     [4]   Social History  Socioeconomic History    Marital status:      Spouse name: Bud Jackson   Tobacco Use    Smoking status: Never     Passive exposure: Past    Smokeless tobacco: Never   Vaping Use    Vaping status: Never Used   Substance and Sexual Activity    Alcohol use: Not Currently    Drug use: Never    Sexual activity: Defer     Social Drivers of Health     Financial Resource Strain: Low Risk  (5/12/2024)    Overall Financial Resource Strain (CARDIA)     Difficulty of Paying Living Expenses: Not very hard   Transportation Needs: No Transportation Needs (5/12/2024)    PRAPARE - Transportation     Lack of Transportation (Medical): No     Lack of Transportation (Non-Medical): No        Devyn Aranda MD  Resident  04/20/25 0029

## 2025-04-20 NOTE — CONSULTS
Vancomycin Dosing by Pharmacy- INITIAL    Melania You is a 29 y.o. year old female who Pharmacy has been consulted for vancomycin dosing for cellulitis, skin and soft tissue. Based on the patient's indication and renal status this patient will be dosed based on a goal AUC of 400-600.     Renal function is currently stable.    Visit Vitals  /74 (BP Location: Left arm, Patient Position: Lying)   Pulse 102   Temp 36.7 °C (98.1 °F) (Temporal)   Resp 16        Lab Results   Component Value Date    CREATININE 0.44 (L) 2025    CREATININE 0.62 2025    CREATININE 0.50 2024    CREATININE 0.39 (L) 2024        Patient weight is as follows:   Vitals:    25 0220   Weight: 140 kg (309 lb)       Cultures:  No results found for the encounter in last 14 days.        I/O last 3 completed shifts:  In: 2213.3 (15.8 mL/kg) [P.O.:200; I.V.:265 (1.9 mL/kg); IV Piggyback:1748.3]  Out: 1000 (7.1 mL/kg) [Urine:1000 (0.2 mL/kg/hr)]  Weight: 140.2 kg   I/O during current shift:  I/O this shift:  In: 1298.3 [P.O.:480; I.V.:818.3]  Out: -     Temp (24hrs), Av.7 °C (98 °F), Min:36.3 °C (97.3 °F), Max:36.9 °C (98.4 °F)         Assessment/Plan     Patient received 2000 mg x1 dose today at 0114.  Will initiate vancomycin maintenance,  2000 mg every 8 hours.    This dosing regimen is predicted by [employer] to result in the following pharmacokinetic parameters:  Loading dose: N/A  Regimen: 2000 mg IV every 8 hours.  Start time: 14:24 on 2025  Exposure target: AUC24 (range)400-600 mg/L.hr   [id number]: 400 mg/L.hr  AUC24,ss: 400 mg/L.hr  Probability of AUC24 > 400: 50 %  Ctrough,ss: 7.4 mg/L  Probability of Ctrough,ss > 20: 16 %    Follow-up level will be ordered on  at 0500 unless clinically indicated sooner.  Will continue to monitor renal function daily while on vancomycin and order serum creatinine at least every 48 hours if not already ordered.  Follow for continued vancomycin needs, clinical  response, and signs/symptoms of toxicity.       Mandy Goldman, PharmD

## 2025-04-20 NOTE — ASSESSMENT & PLAN NOTE
Blood cultures pending, breast ultrasound to assess for possible complicated infection, OB/GYN consult

## 2025-04-20 NOTE — CONSULTS
"Vancomycin Dosing by Pharmacy - Emergency Department    Melania You is a 29 y.o. female who pharmacy has been consulted for vancomycin one-time dosing for cellulitis, skin and soft tissue by an Emergency Department (ED) provider.    CrCl cannot be calculated (Patient's most recent lab result is older than the maximum 3 days allowed.).    HD/PD/GATITO: No    Blood pressure (!) 138/103, pulse (!) 119, temperature 36.3 °C (97.3 °F), resp. rate 20, height 1.702 m (5' 7\"), weight 140 kg (309 lb), SpO2 100%, currently breastfeeding.    Concern for Severe Sepsis and/or Septic Shock: No    Assessment/Plan:  Patient will not be given a loading dose.   Will initiate vancomycin ED dose, 2000 mg (14.3 mg/kg) x 1 dose.  Pharmacy will now discontinue the one time dose consult. Maintenance dose and continuation of vancomycin to be determined by the admitting team. If vancomycin appropriate for continuation, another pharmacy to dose vancomycin consult will be placed at that time.      Thank you for allowing me to take part in the care of this patient. Please contact pharmacy with any questions.    JOSIAH ORTIZ, PharmD   4/19/2025 11:48 PM       "

## 2025-04-20 NOTE — NURSING NOTE
PT. SLEPT AT SHORT INTERVALS. DENIES C/O PAIN AT THIS TIME. LEFT BREAST LESS REDDENED.BED ALARM ON.

## 2025-04-20 NOTE — H&P
History Of Present Illness  Melania You is a 29 y.o. female   PMH; remote gestational diabetes but not currently identified as diabetic, hypertension with previous episodes of preeclampsia, and chronic back pain.     She comes in with painful erythema over her left breast; she is currently exclusively breast feeding her 11 month old daughter.  Chart review indicates that she went to a [facility] on April 13, 7 days ago, complaining of erythema and tenderness to her left breast.  She was prescribed cefdinir which she says she has been taking as prescribed.      Labs are noteworthy for leukocytosis at 16.4 with a neutrophilic predominance, normal lactate at 0.7, modest hypokalemia at 3.3 which is replaced, blood glucose of 144, and total protein of 8.3 suggesting some element of hypovolemia.  hCG is negative.  She does not meet sepsis criteria at this time.          Past Medical History  She has a past medical history of Chronic low back pain with left-sided sciatica, History of gestational diabetes, History of pre-eclampsia, Hypertension, and Morbid obesity with BMI of 45.0-49.9, adult (Multi).    Surgical History  She has a past surgical history that includes Epidural catheter insertion; Epidural blood patch; and Intrauterine device insertion.     Social History  She reports that she has never smoked. She has been exposed to tobacco smoke. She has never used smokeless tobacco. She reports that she does not currently use alcohol. She reports that she does not use drugs.    Family History  Family History[1]     Allergies  Penicillins  Scheduled medications  Scheduled Medications[2]  Continuous medications  Continuous Medications[3]  PRN medications  PRN Medications[4]    Review of Systems  12 systems were reviewed and pertinent findings were addressed in HPI     Physical Exam  GENERAL: No distress.   SKIN: No rashes or lesions.  EYES: PERRLA, EOMI  HEENT: Head: Normocephalic  Neck:  "supple and no adenopathy  LUNGS: Lungs clear to auscultation.   CARDIAC: Normal S1 and S2; no murmurs.   ABDOMEN: Soft, non-tender.   EXTREMITIES: No ulcers, Extremities normal.   NEURO: No focal deficit.      Last Recorded Vitals  Blood pressure 120/74, pulse 102, temperature 36.7 °C (98.1 °F), temperature source Temporal, resp. rate 16, height 1.702 m (5' 7\"), weight 140 kg (309 lb), SpO2 99%, currently breastfeeding.    Relevant Results        Results for orders placed or performed during the hospital encounter of 04/19/25 (from the past 24 hours)   CBC and Auto Differential   Result Value Ref Range    WBC 16.4 (H) 4.4 - 11.3 x10*3/uL    nRBC 0.0 0.0 - 0.0 /100 WBCs    RBC 4.53 4.00 - 5.20 x10*6/uL    Hemoglobin 13.7 12.0 - 16.0 g/dL    Hematocrit 40.7 36.0 - 46.0 %    MCV 90 80 - 100 fL    MCH 30.2 26.0 - 34.0 pg    MCHC 33.7 32.0 - 36.0 g/dL    RDW 13.2 11.5 - 14.5 %    Platelets 373 150 - 450 x10*3/uL    Neutrophils % 72.9 40.0 - 80.0 %    Immature Granulocytes %, Automated 0.4 0.0 - 0.9 %    Lymphocytes % 18.4 13.0 - 44.0 %    Monocytes % 4.1 2.0 - 10.0 %    Eosinophils % 3.8 0.0 - 6.0 %    Basophils % 0.4 0.0 - 2.0 %    Neutrophils Absolute 11.96 (H) 1.20 - 7.70 x10*3/uL    Immature Granulocytes Absolute, Automated 0.07 0.00 - 0.70 x10*3/uL    Lymphocytes Absolute 3.01 1.20 - 4.80 x10*3/uL    Monocytes Absolute 0.68 0.10 - 1.00 x10*3/uL    Eosinophils Absolute 0.62 0.00 - 0.70 x10*3/uL    Basophils Absolute 0.06 0.00 - 0.10 x10*3/uL   Comprehensive metabolic panel   Result Value Ref Range    Glucose 144 (H) 74 - 99 mg/dL    Sodium 138 136 - 145 mmol/L    Potassium 3.3 (L) 3.5 - 5.3 mmol/L    Chloride 102 98 - 107 mmol/L    Bicarbonate 24 21 - 32 mmol/L    Anion Gap 15 10 - 20 mmol/L    Urea Nitrogen 14 6 - 23 mg/dL    Creatinine 0.62 0.50 - 1.05 mg/dL    eGFR >90 >60 mL/min/1.73m*2    Calcium 9.5 8.6 - 10.3 mg/dL    Albumin 4.8 3.4 - 5.0 g/dL    Alkaline Phosphatase 87 33 - 110 U/L    Total Protein 8.3 (H) " 6.4 - 8.2 g/dL    AST 19 9 - 39 U/L    Bilirubin, Total 0.5 0.0 - 1.2 mg/dL    ALT 41 7 - 45 U/L   Lactate   Result Value Ref Range    Lactate 0.7 0.4 - 2.0 mmol/L   Blood Culture    Specimen: Peripheral Venipuncture; Blood culture   Result Value Ref Range    Blood Culture Loaded on Instrument - Culture in progress    Light Blue Top   Result Value Ref Range    Extra Tube Hold for add-ons.    hCG, quantitative, pregnancy   Result Value Ref Range    HCG, Beta-Quantitative 3 <5 mIU/mL   Magnesium   Result Value Ref Range    Magnesium 2.26 1.60 - 2.40 mg/dL   Hemoglobin A1c   Result Value Ref Range    Hemoglobin A1C 5.1 See comment %    Estimated Average Glucose 100 Not Established mg/dL   Blood Culture    Specimen: Peripheral Venipuncture; Blood culture   Result Value Ref Range    Blood Culture Loaded on Instrument - Culture in progress    CBC   Result Value Ref Range    WBC 11.2 4.4 - 11.3 x10*3/uL    nRBC 0.0 0.0 - 0.0 /100 WBCs    RBC 4.09 4.00 - 5.20 x10*6/uL    Hemoglobin 12.3 12.0 - 16.0 g/dL    Hematocrit 37.0 36.0 - 46.0 %    MCV 91 80 - 100 fL    MCH 30.1 26.0 - 34.0 pg    MCHC 33.2 32.0 - 36.0 g/dL    RDW 13.3 11.5 - 14.5 %    Platelets 312 150 - 450 x10*3/uL   Basic metabolic panel   Result Value Ref Range    Glucose 107 (H) 74 - 99 mg/dL    Sodium 140 136 - 145 mmol/L    Potassium 3.4 (L) 3.5 - 5.3 mmol/L    Chloride 108 (H) 98 - 107 mmol/L    Bicarbonate 22 21 - 32 mmol/L    Anion Gap 13 10 - 20 mmol/L    Urea Nitrogen 8 6 - 23 mg/dL    Creatinine 0.44 (L) 0.50 - 1.05 mg/dL    eGFR >90 >60 mL/min/1.73m*2    Calcium 8.7 8.6 - 10.3 mg/dL          Assessment/Plan   Assessment & Plan  Mastitis    Failure of outpatient treatment    Hypokalemia      // Mastitis  Continue ceftriaxone, check nasal MRSA screen to evaluate for ongoing need for additional vancomycin dose    // Failure of outpatient treatment  Blood cultures pending, breast ultrasound to assess for possible complicated infection, OB/GYN consult    //  Hypokalemia  Replaced, recheck in the morning      Jude Hardy MD         [1]   Family History  Problem Relation Name Age of Onset    Hypertension Mother      Diabetes Mother      Hypertension Father     [2] [START ON 4/21/2025] cefTRIAXone, 2 g, intravenous, q24h  lactobacillus acidophilus, 1 tablet, oral, BID  potassium chloride CR, 40 mEq, oral, Once  sodium chloride 0.9%, 10 mL, intravenous, q8h ALONSO  [3] lactated Ringer's, 100 mL/hr, Last Rate: 100 mL/hr (04/20/25 1112)  [4] PRN medications: ibuprofen, melatonin, ondansetron ODT **OR** ondansetron, polyethylene glycol

## 2025-04-20 NOTE — CONSULTS
Infectious Disease Consult    PATIENT NAME: eMlania You    MRN: [id number]  SERVICE DATE:  4/20/2025   SERVICE TIME:  1:48 PM    SIGNATURE: Leslie Huang MD    PRIMARY CARE PHYSICIAN: Joyce Dee MD  REASON FOR CONSULT: Left mastitis  REQUESTING PHYSICIAN: Dr. Hardy        ASSESSMENT :   -Left mastitis  -Leukocytosis now resolved  -Breast-feeding  -Allergic to penicillin with rash  - History of hypertension, preeclampsia, chronic back pain, gestational diabetes      PLAN:  -Follow-up blood culture  -Follow-up discharged with culture  -Continue vancomycin dosing per pharmacy  -Continue ceftriaxone  -Closely monitor for antibiotics side effects including rash, Diarrhea/CDI, thrombocytopenia, GATITO, etc.      Will continue to follow.          HPI  29-year-old female with historyof hypertension, preeclampsia, chronic back pain, gestational diabetes, who presented with left breast pain and redness while breast-feeding, prescribed cefdinir outpatient with no improvement.  CT showed findings consistent with mastitis, no abscess.  Drainage culture was sent.  ID team was consulted for antibiotics management    CURRENT ALLERGIES:   Allergies as of 04/19/2025 - Reviewed 04/19/2025   Allergen Reaction Noted    Penicillins Rash 11/03/2023     MEDICATIONS:  Current Medications[1]       COMPLETE REVIEW OF SYSTEMS:    Review of systems shows no findings other than what is described in the narrative above.  Specifically, the patient has had no significant changes in eyesight, no sore throat, no post nasal drip, no ear pains.  There has been no cough or chest pains, pleuritic or otherwise.  There has been no abdominal pain, no nausea or vomiting, nor diarrhea.      PHYSICAL EXAM:  Patient Vitals for the past 24 hrs:   BP Temp Temp src Pulse Resp SpO2 Height Weight   04/20/25 0800 120/74 36.7 °C (98.1 °F) Temporal 102 16 99 % -- --   04/20/25 0651 170/78 -- -- 98 -- -- -- --   04/20/25 0220 (!) 149/106 36.9 °C (98.4 °F)  "Temporal 100 18 100 % 1.702 m (5' 7\") 140 kg (309 lb)   04/20/25 0158 (!) 150/95 36.8 °C (98.2 °F) Temporal 93 16 99 % -- --   04/20/25 0056 (!) 144/103 -- -- (!) 108 18 98 % -- --   04/19/25 2301 (!) 138/103 36.3 °C (97.3 °F) -- (!) 119 20 100 % 1.702 m (5' 7\") 140 kg (309 lb)     Body mass index is 48.4 kg/m².  I examined the patient with the nurse in the room   left breast erythema and swelling      Labs:  Lab Results   Component Value Date    WBC 11.2 04/20/2025    HGB 12.3 04/20/2025    HCT 37.0 04/20/2025    MCV 91 04/20/2025     04/20/2025     Lab Results   Component Value Date    GLUCOSE 107 (H) 04/20/2025    CALCIUM 8.7 04/20/2025     04/20/2025    K 3.4 (L) 04/20/2025    CO2 22 04/20/2025     (H) 04/20/2025    BUN 8 04/20/2025    CREATININE 0.44 (L) 04/20/2025   ESR: --No results found for: \"SEDRATE\"No results found for: \"CRP\"  Lab Results   Component Value Date    ALT 41 04/19/2025    AST 19 04/19/2025    ALKPHOS 87 04/19/2025    BILITOT 0.5 04/19/2025       DATA:   Diagnostic tests reviewed for today's visit:    Labs this admission reviewed  Imagings this admission reviewed  Cultures: Reviewed        Thank you so much for this consultation         Leslie Huang MD.   Infectious Disease Attending        This note was partially created using voice recognition software and is inherently subject to errors including those of syntax and \"sound-alike\" substitutions which may escape proofreading. In such instances, original meaning may be extrapolated by contextual derivation            [1]   Current Facility-Administered Medications:     [START ON 4/21/2025] cefTRIAXone (Rocephin) 2 g in dextrose (iso) IV 50 mL, 2 g, intravenous, q24h, Suzanne L Orleman, APRN-CNP    ibuprofen tablet 400 mg, 400 mg, oral, q6h PRN, KIMMY Morillo    lactated Ringer's infusion, 100 mL/hr, intravenous, Continuous, KIMMY Morillo, Last Rate: 100 mL/hr at 04/20/25 1112, 100 mL/hr at " 04/20/25 1112    lactobacillus acidophilus tablet 1 tablet, 1 tablet, oral, BID, KIMMY Morillo, 1 tablet at 04/20/25 0851    melatonin tablet 3 mg, 3 mg, oral, Nightly PRN, KIMMY Morillo    ondansetron ODT (Zofran-ODT) disintegrating tablet 4 mg, 4 mg, oral, q8h PRN **OR** ondansetron (Zofran) injection 4 mg, 4 mg, intravenous, q8h PRN, KIMMY Morillo    polyethylene glycol (Glycolax, Miralax) packet 17 g, 17 g, oral, Daily PRN, KIMMY Morillo    sodium chloride 0.9% flush 10 mL, 10 mL, intravenous, q8h ALONSO, KIMMY Morillo, 10 mL at 04/20/25 0503

## 2025-04-20 NOTE — PROGRESS NOTES
Advanced Practice Admit Note    Melania You is a 29 y.o. female presenting to [facility] on 4/19/2025 with Mastitis. Medical history is significant for remote gestational diabetes but not currently identified as diabetic, hypertension with previous episodes of preeclampsia, and chronic back pain.    She comes in tonight with painful erythema over her left breast; she is currently exclusively breast feeding her 11 month old daughter.  Chart review indicates that she went to a [facility] facility on April 13, 7 days ago, complaining of erythema and tenderness to her left breast.  At that time she reported that her symptoms have been present for a few days already.  She was prescribed cefdinir which she says she has been taking as prescribed.    Yesterday she presented to the same [facility] clinic with worsening erythema and tenderness over her left breast.  She says that her symptoms initially improved but since then have continued to worsen for the past 2 days or so.  During that visit she complained of generalized malaise, subjective fevers, and chills as well as some unusual discharge from her left nipple.  At the advice of the physician assistant she saw at [facility] she came to the emergency department [facility].    Labs are noteworthy for leukocytosis at 16.4 with a neutrophilic predominance, normal lactate at 0.7, modest hypokalemia at 3.3 which is replaced, blood glucose of 144, and total protein of 8.3 suggesting some element of hypovolemia.  hCG is negative.  She does not meet sepsis criteria at this time.    Given her apparent failure of outpatient treatment there is concern for possible deep tissue infection and/or abscess formation.  She has a palpable area of induration in the center of the erythematous area of her breast, near the 12 o'clock position.  Breast ultrasound was ordered to assess and consult was placed to on call OB/GYN for assistance managing her medications given  that she is breast-feeding.  Blood cultures are pending from the emergency department as well as nasal MRSA screen.  She was given a dose of vancomycin which will be continued should she screen positive and ceftriaxone has been continued in the meantime.    Past Medical History  Past Medical History:   Diagnosis Date    Chronic low back pain with left-sided sciatica     History of gestational diabetes     History of pre-eclampsia     Hypertension     Morbid obesity with BMI of 45.0-49.9, adult (Multi)        Surgical History  Past Surgical History:   Procedure Laterality Date    EPIDURAL BLOOD PATCH      EPIDURAL CATHETER INSERTION      INTRAUTERINE DEVICE INSERTION          Social History  Social History     Tobacco Use    Smoking status: Never     Passive exposure: Past    Smokeless tobacco: Never   Vaping Use    Vaping status: Never Used   Substance Use Topics    Alcohol use: Not Currently    Drug use: Never        Family History  Family History   Problem Relation Name Age of Onset    Hypertension Mother      Diabetes Mother      Hypertension Father          Allergies  Penicillins    Review of Systems   Constitutional:  Positive for chills, fatigue and fever.   HENT:  Negative for trouble swallowing.    Eyes:  Negative for visual disturbance.   Respiratory:  Negative for cough, chest tightness and shortness of breath.    Cardiovascular:  Negative for chest pain, palpitations and leg swelling.   Gastrointestinal:  Negative for abdominal pain, nausea and vomiting.   Genitourinary:  Negative for dysuria, flank pain and hematuria.   Musculoskeletal:  Negative for neck pain and neck stiffness.   Skin:  Positive for color change. Negative for rash and wound.   Neurological:  Negative for dizziness and headaches.   Psychiatric/Behavioral:  Negative for confusion.        Physical Exam  Constitutional:       General: She is not in acute distress.     Appearance: Normal appearance. She is not ill-appearing or  "toxic-appearing.   HENT:      Head: Normocephalic.      Right Ear: External ear normal.      Left Ear: External ear normal.      Nose: Nose normal.      Mouth/Throat:      Mouth: Mucous membranes are moist.   Eyes:      General: No scleral icterus.  Cardiovascular:      Rate and Rhythm: Normal rate and regular rhythm.      Pulses: Normal pulses.   Pulmonary:      Effort: Pulmonary effort is normal. No respiratory distress.   Abdominal:      General: Bowel sounds are normal. There is no distension.      Palpations: Abdomen is soft.      Tenderness: There is no abdominal tenderness.   Musculoskeletal:      Cervical back: No rigidity or tenderness.      Right lower leg: No edema.      Left lower leg: No edema.   Skin:     Capillary Refill: Capillary refill takes less than 2 seconds.      Findings: Erythema present. No lesion or rash.   Neurological:      General: No focal deficit present.      Mental Status: She is alert and oriented to person, place, and time. Mental status is at baseline.   Psychiatric:         Mood and Affect: Mood normal.         Behavior: Behavior normal.       Last Recorded Vitals  Blood pressure (!) 149/106, pulse 100, temperature 36.9 °C (98.4 °F), temperature source Temporal, resp. rate 18, height 1.702 m (5' 7\"), weight 140 kg (309 lb), SpO2 100%, currently breastfeeding.  Intake/Output last 3 Shifts:  No intake/output data recorded.    Relevant Results  Results for orders placed or performed during the hospital encounter of 04/19/25 (from the past 24 hours)   CBC and Auto Differential   Result Value Ref Range    WBC 16.4 (H) 4.4 - 11.3 x10*3/uL    nRBC 0.0 0.0 - 0.0 /100 WBCs    RBC 4.53 4.00 - 5.20 x10*6/uL    Hemoglobin 13.7 12.0 - 16.0 g/dL    Hematocrit 40.7 36.0 - 46.0 %    MCV 90 80 - 100 fL    MCH 30.2 26.0 - 34.0 pg    MCHC 33.7 32.0 - 36.0 g/dL    RDW 13.2 11.5 - 14.5 %    Platelets 373 150 - 450 x10*3/uL    Neutrophils % 72.9 40.0 - 80.0 %    Immature Granulocytes %, Automated 0.4 " 0.0 - 0.9 %    Lymphocytes % 18.4 13.0 - 44.0 %    Monocytes % 4.1 2.0 - 10.0 %    Eosinophils % 3.8 0.0 - 6.0 %    Basophils % 0.4 0.0 - 2.0 %    Neutrophils Absolute 11.96 (H) 1.20 - 7.70 x10*3/uL    Immature Granulocytes Absolute, Automated 0.07 0.00 - 0.70 x10*3/uL    Lymphocytes Absolute 3.01 1.20 - 4.80 x10*3/uL    Monocytes Absolute 0.68 0.10 - 1.00 x10*3/uL    Eosinophils Absolute 0.62 0.00 - 0.70 x10*3/uL    Basophils Absolute 0.06 0.00 - 0.10 x10*3/uL   Comprehensive metabolic panel   Result Value Ref Range    Glucose 144 (H) 74 - 99 mg/dL    Sodium 138 136 - 145 mmol/L    Potassium 3.3 (L) 3.5 - 5.3 mmol/L    Chloride 102 98 - 107 mmol/L    Bicarbonate 24 21 - 32 mmol/L    Anion Gap 15 10 - 20 mmol/L    Urea Nitrogen 14 6 - 23 mg/dL    Creatinine 0.62 0.50 - 1.05 mg/dL    eGFR >90 >60 mL/min/1.73m*2    Calcium 9.5 8.6 - 10.3 mg/dL    Albumin 4.8 3.4 - 5.0 g/dL    Alkaline Phosphatase 87 33 - 110 U/L    Total Protein 8.3 (H) 6.4 - 8.2 g/dL    AST 19 9 - 39 U/L    Bilirubin, Total 0.5 0.0 - 1.2 mg/dL    ALT 41 7 - 45 U/L   Lactate   Result Value Ref Range    Lactate 0.7 0.4 - 2.0 mmol/L   Light Blue Top   Result Value Ref Range    Extra Tube Hold for add-ons.    hCG, quantitative, pregnancy   Result Value Ref Range    HCG, Beta-Quantitative 3 <5 mIU/mL   Magnesium   Result Value Ref Range    Magnesium 2.26 1.60 - 2.40 mg/dL        Medications  Scheduled medications  Scheduled Medications[1]  Continuous medications  Continuous Medications[2]  PRN medications  acetaminophen, 650 mg, q4h PRN   Or  acetaminophen, 650 mg, q4h PRN   Or  acetaminophen, 650 mg, q4h PRN  melatonin, 3 mg, Nightly PRN  ondansetron ODT, 4 mg, q8h PRN   Or  ondansetron, 4 mg, q8h PRN  polyethylene glycol, 17 g, Daily PRN      Imaging  No results found.    Cardiology, Vascular, and Other Imaging  No other imaging results found for the past 7 days     Assessment & Plan  Mastitis  Continue ceftriaxone, check nasal MRSA screen to evaluate  for ongoing need for additional vancomycin dose  Failure of outpatient treatment  Blood cultures pending, breast ultrasound to assess for possible complicated infection, OB/GYN consult  Hypokalemia  Replaced, recheck in the morning    I spent 45 minutes in the professional and overall care of this patient.      Code Status: Full code    See orders for additional plan elements, full history and physical to follow from attending physician.    Suzanne Hernandez, APRN-CNP  [facility] [phone]  Attending physician Jude Hardy MD         [1] [START ON 4/21/2025] cefTRIAXone, 2 g, intravenous, q24h  lactobacillus acidophilus, 1 tablet, oral, BID  sodium chloride 0.9%, 10 mL, intravenous, q8h ALONSO  [2] lactated Ringer's, 100 mL/hr, Last Rate: 100 mL/hr (04/20/25 0328)

## 2025-04-20 NOTE — CARE PLAN
The patient's goals for the shift include      The clinical goals for the shift include GET SOMEREST/CONTROL  PAIN

## 2025-04-20 NOTE — ASSESSMENT & PLAN NOTE
Continue ceftriaxone, check nasal MRSA screen to evaluate for ongoing need for additional vancomycin dose

## 2025-04-20 NOTE — CARE PLAN
The patient's goals for the shift include      The clinical goals for the shift include GET SOMEREST/CONTROL  PAIN    Problem: Pain - Adult  Goal: Verbalizes/displays adequate comfort level or baseline comfort level  Outcome: Progressing     Problem: Safety - Adult  Goal: Free from fall injury  Outcome: Progressing     Problem: Chronic Conditions and Co-morbidities  Goal: Patient's chronic conditions and co-morbidity symptoms are monitored and maintained or improved  Outcome: Progressing     Problem: Nutrition  Goal: Nutrient intake appropriate for maintaining nutritional needs  Outcome: Progressing     Problem: Fall/Injury  Goal: Not fall by end of shift  Outcome: Progressing  Goal: Be free from injury by end of the shift  Outcome: Progressing  Goal: Verbalize understanding of personal risk factors for fall in the hospital  Outcome: Progressing  Goal: Verbalize understanding of risk factor reduction measures to prevent injury from fall in the home  Outcome: Progressing  Goal: Use assistive devices by end of the shift  Outcome: Progressing     Problem: Pain  Goal: Takes deep breaths with improved pain control throughout the shift  Outcome: Progressing  Goal: Turns in bed with improved pain control throughout the shift  Outcome: Progressing  Goal: Walks with improved pain control throughout the shift  Outcome: Progressing  Goal: Performs ADL's with improved pain control throughout shift  Outcome: Progressing  Goal: Free from opioid side effects throughout the shift  Outcome: Progressing  Goal: Free from acute confusion related to pain meds throughout the shift  Outcome: Progressing

## 2025-04-21 ENCOUNTER — PHARMACY VISIT (OUTPATIENT)
Dept: PHARMACY | Facility: CLINIC | Age: 30
End: 2025-04-21
Payer: MEDICARE

## 2025-04-21 VITALS
OXYGEN SATURATION: 100 % | SYSTOLIC BLOOD PRESSURE: 138 MMHG | WEIGHT: 293 LBS | HEART RATE: 87 BPM | BODY MASS INDEX: 45.99 KG/M2 | RESPIRATION RATE: 20 BRPM | TEMPERATURE: 97.2 F | DIASTOLIC BLOOD PRESSURE: 82 MMHG | HEIGHT: 67 IN

## 2025-04-21 LAB
ANION GAP SERPL CALC-SCNC: 13 MMOL/L (ref 10–20)
BUN SERPL-MCNC: 8 MG/DL (ref 6–23)
CALCIUM SERPL-MCNC: 9 MG/DL (ref 8.6–10.3)
CHLORIDE SERPL-SCNC: 108 MMOL/L (ref 98–107)
CO2 SERPL-SCNC: 24 MMOL/L (ref 21–32)
CREAT SERPL-MCNC: 0.55 MG/DL (ref 0.5–1.05)
EGFRCR SERPLBLD CKD-EPI 2021: >90 ML/MIN/1.73M*2
ERYTHROCYTE [DISTWIDTH] IN BLOOD BY AUTOMATED COUNT: 13.4 % (ref 11.5–14.5)
GLUCOSE SERPL-MCNC: 103 MG/DL (ref 74–99)
HCT VFR BLD AUTO: 39.2 % (ref 36–46)
HGB BLD-MCNC: 13 G/DL (ref 12–16)
MCH RBC QN AUTO: 30.3 PG (ref 26–34)
MCHC RBC AUTO-ENTMCNC: 33.2 G/DL (ref 32–36)
MCV RBC AUTO: 91 FL (ref 80–100)
NRBC BLD-RTO: 0 /100 WBCS (ref 0–0)
PLATELET # BLD AUTO: 327 X10*3/UL (ref 150–450)
POTASSIUM SERPL-SCNC: 3.8 MMOL/L (ref 3.5–5.3)
RBC # BLD AUTO: 4.29 X10*6/UL (ref 4–5.2)
SODIUM SERPL-SCNC: 141 MMOL/L (ref 136–145)
VANCOMYCIN SERPL-MCNC: 13.4 UG/ML (ref 5–20)
WBC # BLD AUTO: 8.9 X10*3/UL (ref 4.4–11.3)

## 2025-04-21 PROCEDURE — G0378 HOSPITAL OBSERVATION PER HR: HCPCS

## 2025-04-21 PROCEDURE — 2500000004 HC RX 250 GENERAL PHARMACY W/ HCPCS (ALT 636 FOR OP/ED): Mod: JZ

## 2025-04-21 PROCEDURE — 80048 BASIC METABOLIC PNL TOTAL CA: CPT | Performed by: NURSE PRACTITIONER

## 2025-04-21 PROCEDURE — 2500000001 HC RX 250 WO HCPCS SELF ADMINISTERED DRUGS (ALT 637 FOR MEDICARE OP): Performed by: NURSE PRACTITIONER

## 2025-04-21 PROCEDURE — 85027 COMPLETE CBC AUTOMATED: CPT | Performed by: NURSE PRACTITIONER

## 2025-04-21 PROCEDURE — RXMED WILLOW AMBULATORY MEDICATION CHARGE

## 2025-04-21 PROCEDURE — 36415 COLL VENOUS BLD VENIPUNCTURE: CPT | Performed by: NURSE PRACTITIONER

## 2025-04-21 PROCEDURE — 2500000004 HC RX 250 GENERAL PHARMACY W/ HCPCS (ALT 636 FOR OP/ED): Mod: JZ | Performed by: NURSE PRACTITIONER

## 2025-04-21 PROCEDURE — 2500000002 HC RX 250 W HCPCS SELF ADMINISTERED DRUGS (ALT 637 FOR MEDICARE OP, ALT 636 FOR OP/ED): Performed by: NURSE PRACTITIONER

## 2025-04-21 PROCEDURE — 96365 THER/PROPH/DIAG IV INF INIT: CPT

## 2025-04-21 PROCEDURE — 96366 THER/PROPH/DIAG IV INF ADDON: CPT

## 2025-04-21 PROCEDURE — 80202 ASSAY OF VANCOMYCIN: CPT

## 2025-04-21 RX ORDER — SULFAMETHOXAZOLE AND TRIMETHOPRIM 800; 160 MG/1; MG/1
1 TABLET ORAL 2 TIMES DAILY
Qty: 20 TABLET | Refills: 0 | Status: SHIPPED | OUTPATIENT
Start: 2025-04-21 | End: 2025-05-01

## 2025-04-21 RX ORDER — POTASSIUM CHLORIDE 20 MEQ/1
20 TABLET, EXTENDED RELEASE ORAL ONCE
Status: COMPLETED | OUTPATIENT
Start: 2025-04-21 | End: 2025-04-21

## 2025-04-21 RX ADMIN — Medication 1 TABLET: at 09:18

## 2025-04-21 RX ADMIN — NIFEDIPINE 90 MG: 30 TABLET, FILM COATED, EXTENDED RELEASE ORAL at 10:33

## 2025-04-21 RX ADMIN — IBUPROFEN 400 MG: 400 TABLET, FILM COATED ORAL at 09:18

## 2025-04-21 RX ADMIN — CEFTRIAXONE SODIUM 2 G: 2 INJECTION, SOLUTION INTRAVENOUS at 00:43

## 2025-04-21 RX ADMIN — POTASSIUM CHLORIDE 20 MEQ: 1500 TABLET, EXTENDED RELEASE ORAL at 10:33

## 2025-04-21 RX ADMIN — Medication 10 ML: at 06:10

## 2025-04-21 RX ADMIN — Medication 2000 MG: at 09:17

## 2025-04-21 ASSESSMENT — COGNITIVE AND FUNCTIONAL STATUS - GENERAL
DAILY ACTIVITIY SCORE: 24
MOBILITY SCORE: 24

## 2025-04-21 ASSESSMENT — PAIN SCALES - GENERAL
PAINLEVEL_OUTOF10: 5 - MODERATE PAIN
PAINLEVEL_OUTOF10: 0 - NO PAIN
PAINLEVEL_OUTOF10: 4

## 2025-04-21 ASSESSMENT — PAIN DESCRIPTION - DESCRIPTORS
DESCRIPTORS: DULL
DESCRIPTORS: ACHING

## 2025-04-21 ASSESSMENT — PAIN DESCRIPTION - LOCATION: LOCATION: BREAST

## 2025-04-21 ASSESSMENT — PAIN - FUNCTIONAL ASSESSMENT
PAIN_FUNCTIONAL_ASSESSMENT: 0-10

## 2025-04-21 ASSESSMENT — PAIN DESCRIPTION - ORIENTATION: ORIENTATION: LEFT

## 2025-04-21 NOTE — CARE PLAN
The patient's goals for the shift include pain/infection controlled     The clinical goals for the shift include remains afebrile/lefr breast will continue to decrease redness and swelling.

## 2025-04-21 NOTE — PROGRESS NOTES
" INPATIENT PROGRESS NOTES    PATIENT NAME: Melania You    MRN: [id number]  SERVICE DATE:  4/21/2025   SERVICE TIME:  1:45 PM    SIGNATURE: Leslie Huang MD         ASSESSMENT :   -Left mastitis  -Leukocytosis now resolved  -Breast-feeding  -Allergic to penicillin with rash  - History of hypertension, preeclampsia, chronic back pain, gestational diabetes        PLAN:  -Negative blood culture  -Follow-up discharge culture   -Can be discharged on Bactrim DS twice daily for 7 to 10 days  -Closely monitor for antibiotics side effects including rash, Diarrhea/CDI, thrombocytopenia, GATITO, etc.           SUBJECTIVE  Afebrile  No events overnight       OBJECTIVE  PHYSICAL EXAM:   Patient Vitals for the past 24 hrs:   BP Temp Temp src Pulse Resp SpO2   04/21/25 1215 138/82 36.2 °C (97.2 °F) Temporal 87 20 100 %   04/21/25 0800 (!) 156/99 36.5 °C (97.7 °F) Temporal 97 18 100 %   04/21/25 0028 153/76 36.7 °C (98.1 °F) Temporal 100 16 99 %   04/20/25 1957 142/66 36.9 °C (98.4 °F) Temporal 101 16 100 %   04/20/25 1600 135/72 36.8 °C (98.2 °F) Temporal 105 16 100 %         Left breast erythema and swelling slowly improving    Labs:  Lab Results   Component Value Date    WBC 8.9 04/21/2025    HGB 13.0 04/21/2025    HCT 39.2 04/21/2025    MCV 91 04/21/2025     04/21/2025     Lab Results   Component Value Date    GLUCOSE 103 (H) 04/21/2025    CALCIUM 9.0 04/21/2025     04/21/2025    K 3.8 04/21/2025    CO2 24 04/21/2025     (H) 04/21/2025    BUN 8 04/21/2025    CREATININE 0.55 04/21/2025   ESR: --No results found for: \"SEDRATE\"No results found for: \"CRP\"  Lab Results   Component Value Date    ALT 41 04/19/2025    AST 19 04/19/2025    ALKPHOS 87 04/19/2025    BILITOT 0.5 04/19/2025         DATA:   Diagnostic tests reviewed for today's visit:    Labs this admission reviewed  Imagings this admission reviewed  Cultures: Reviewed        Leslie Huang MD.   Infectious Disease Attending            This note was " "partially created using voice recognition software and is inherently subject to errors including those of syntax and \"sound-alike\" substitutions which may escape proofreading. In such instances, original meaning may be extrapolated by contextual derivation       "

## 2025-04-21 NOTE — NURSING NOTE
PT. SLEPT FAIRLY. HAS BEEN AFEBRILE. LEFT BREAST MUCH LESS REDDENED AND SWOLLEN. PT. DENIES C/O PAIN.

## 2025-04-21 NOTE — DISCHARGE SUMMARY
Discharge Diagnosis  Mastitis    Issues Requiring Follow-Up      Test Results Pending At Discharge  Pending Labs       Order Current Status    Staphylococcus aureus/MRSA colonization, Culture In process    Blood Culture Preliminary result    Blood Culture Preliminary result    Sterile Fluid Culture/Smear Preliminary result            Hospital Course       29 y.o. female   PMH; remote gestational diabetes but not currently identified as diabetic, hypertension with previous episodes of preeclampsia, and chronic back pain.    // Mastitis  Continue ceftriaxone, check nasal MRSA screen to evaluate for ongoing need for additional vancomycin dose    Discussed with pharmacy and ID, plan to discharge home on Bactrim for 7 to 10 days.     // Failure of outpatient treatment  Blood cultures pending, breast ultrasound to assess for possible complicated infection, OB/GYN consult     // Hypokalemia  Replaced, recheck in the morning        Pertinent Physical Exam At Time of Discharge  Physical Exam  GENERAL: No distress.   SKIN: No rashes or lesions.  EYES: PERRLA, EOMI  HEENT: Head: Normocephalic  Neck: supple and no adenopathy  LUNGS: Lungs clear to auscultation.   CARDIAC: Normal S1 and S2; no murmurs.   ABDOMEN: Soft, non-tender.   EXTREMITIES: No ulcers, Extremities normal.   NEURO: No focal deficit.     Outpatient Follow-Up  No future appointments.  Time spent on coordinating dc was 40 mins     Jude Hardy MD

## 2025-04-21 NOTE — PROGRESS NOTES
Vancomycin Dosing by Pharmacy- FOLLOW UP    Melania You is a 29 y.o. year old female who Pharmacy has been consulted for vancomycin dosing for cellulitis, skin and soft tissue. Based on the patient's indication and renal status this patient is being dosed based on a goal AUC of 400-600.     Renal function is currently stable.    Current vancomycin dose: 2000 mg given every 8 hours    Estimated vancomycin AUC on current dose: 564 mg/L.hr     Visit Vitals  BP (!) 156/99 (BP Location: Left arm, Patient Position: Sitting)   Pulse 97   Temp 36.5 °C (97.7 °F) (Temporal)   Resp 18        Lab Results   Component Value Date    CREATININE 0.55 2025    CREATININE 0.44 (L) 2025    CREATININE 0.62 2025    CREATININE 0.50 2024        Patient weight is as follows:   Vitals:    25 0220   Weight: 140 kg (309 lb)       Cultures:  No results found for the encounter in last 14 days.       I/O last 3 completed shifts:  In: 5158.3 (36.8 mL/kg) [P.O.:780; I.V.:1580 (11.3 mL/kg); IV Piggyback:2798.3]  Out: 1800 (12.8 mL/kg) [Urine:1800 (0.4 mL/kg/hr)]  Weight: 140.2 kg   I/O during current shift:  No intake/output data recorded.    Temp (24hrs), Av.7 °C (98.1 °F), Min:36.5 °C (97.7 °F), Max:36.9 °C (98.4 °F)      Assessment/Plan    Within goal AUC range. Continue current vancomycin regimen.    This dosing regimen is predicted by [employer] to result in the following pharmacokinetic parameters:  Regimen: 2000 mg IV every 8 hours.  Start time: 09:12 on 2025  Exposure target: AUC24 (range)400-600 mg/L.hr   UTN10-55: 564 mg/L.hr  AUC24,ss: 565 mg/L.hr  Probability of AUC24 > 400: 100 %  Ctrough,ss: 13.9 mg/L  Probability of Ctrough,ss > 20: 2 %    The next level will be obtained on  at 0500. May be obtained sooner if clinically indicated.   Will continue to monitor renal function daily while on vancomycin and order serum creatinine at least every 48 hours if not already ordered.  Follow for  continued vancomycin needs, clinical response, and signs/symptoms of toxicity.     Nay Carlin, PharmD, BCPS, BCIDP  Clinical Pharmacy Specialist, Infectious Diseases  [phone]

## 2025-04-21 NOTE — CARE PLAN
The patient's goals for the shift include      The clinical goals for the shift include remains afebrile/lefr breast will continue to decrease redness and swelling.    Problem: Pain - Adult  Goal: Verbalizes/displays adequate comfort level or baseline comfort level  Outcome: Progressing     Problem: Safety - Adult  Goal: Free from fall injury  Outcome: Progressing     Problem: Chronic Conditions and Co-morbidities  Goal: Patient's chronic conditions and co-morbidity symptoms are monitored and maintained or improved  Outcome: Progressing     Problem: Nutrition  Goal: Nutrient intake appropriate for maintaining nutritional needs  Outcome: Progressing     Problem: Fall/Injury  Goal: Not fall by end of shift  Outcome: Progressing  Goal: Be free from injury by end of the shift  Outcome: Progressing  Goal: Verbalize understanding of personal risk factors for fall in the hospital  Outcome: Progressing  Goal: Verbalize understanding of risk factor reduction measures to prevent injury from fall in the home  Outcome: Progressing  Goal: Use assistive devices by end of the shift  Outcome: Progressing     Problem: Pain  Goal: Takes deep breaths with improved pain control throughout the shift  Outcome: Progressing  Goal: Turns in bed with improved pain control throughout the shift  Outcome: Progressing  Goal: Walks with improved pain control throughout the shift  Outcome: Progressing  Goal: Performs ADL's with improved pain control throughout shift  Outcome: Progressing  Goal: Free from opioid side effects throughout the shift  Outcome: Progressing  Goal: Free from acute confusion related to pain meds throughout the shift  Outcome: Progressing

## 2025-04-22 ENCOUNTER — PATIENT OUTREACH (OUTPATIENT)
Dept: PRIMARY CARE | Facility: CLINIC | Age: 30
End: 2025-04-22
Payer: COMMERCIAL

## 2025-04-22 LAB — STAPHYLOCOCCUS SPEC CULT: NORMAL

## 2025-04-22 NOTE — PROGRESS NOTES
Discharge Facility:  [facility]    Discharge Diagnosis:  Mastitis     Admission Date:  4/20/25  Discharge Date:   4/21/25    PCP Appointment Date:  Appointment with Joyce Dee MD (04/24/2025)   Specialist Appointment Date:   TBD  Hospital Encounter and Summary Linked: Yes  ED to Hosp-Admission (Discharged) with Jude Hardy MD; Walter Lucas DO (04/19/2025)     Discharge Summary by Jude Hardy MD (04/21/2025 12:42)     Two attempts were made to reach patient within two business days after discharge. I left voicemail to introduce myself and the TCM program to Melania You. I encouraged patient to contact office or myself for follow up appt. I gave my contact information to return my call so we can go over discharge instructions and see if I can assist in anyway.    -Pt now has appt with PCP on 4/24/25

## 2025-04-24 ENCOUNTER — OFFICE VISIT (OUTPATIENT)
Dept: PRIMARY CARE | Facility: CLINIC | Age: 30
End: 2025-04-24
Payer: COMMERCIAL

## 2025-04-24 VITALS
HEIGHT: 67 IN | HEART RATE: 97 BPM | BODY MASS INDEX: 48.4 KG/M2 | TEMPERATURE: 98.6 F | DIASTOLIC BLOOD PRESSURE: 86 MMHG | RESPIRATION RATE: 18 BRPM | SYSTOLIC BLOOD PRESSURE: 142 MMHG | OXYGEN SATURATION: 99 %

## 2025-04-24 DIAGNOSIS — E66.01 MORBID OBESITY (MULTI): ICD-10-CM

## 2025-04-24 DIAGNOSIS — O11.9 CHRONIC HYPERTENSION WITH SUPERIMPOSED PREECLAMPSIA (HHS-HCC): ICD-10-CM

## 2025-04-24 DIAGNOSIS — Z09 HOSPITAL DISCHARGE FOLLOW-UP: Primary | ICD-10-CM

## 2025-04-24 DIAGNOSIS — N61.0 MASTITIS: ICD-10-CM

## 2025-04-24 LAB
BACTERIA BLD CULT: NORMAL
BACTERIA BLD CULT: NORMAL
BACTERIA FLD CULT: NORMAL
GRAM STN SPEC: NORMAL
GRAM STN SPEC: NORMAL

## 2025-04-24 PROCEDURE — 99496 TRANSJ CARE MGMT HIGH F2F 7D: CPT

## 2025-04-24 PROCEDURE — 1036F TOBACCO NON-USER: CPT

## 2025-04-24 PROCEDURE — 3077F SYST BP >= 140 MM HG: CPT

## 2025-04-24 PROCEDURE — 3079F DIAST BP 80-89 MM HG: CPT

## 2025-04-24 ASSESSMENT — PATIENT HEALTH QUESTIONNAIRE - PHQ9
1. LITTLE INTEREST OR PLEASURE IN DOING THINGS: NOT AT ALL
SUM OF ALL RESPONSES TO PHQ9 QUESTIONS 1 AND 2: 0
2. FEELING DOWN, DEPRESSED OR HOPELESS: NOT AT ALL

## 2025-04-24 NOTE — PROGRESS NOTES
"Subjective   Patient ID: Melania You is a 29 y.o. female who presents for Follow-up (From hospital visit on 4/19/25).    HPI   29-year-old female with past medical history of hypertension, recent admission to the hospital with mastitis presented today for hospital discharge follow-up.  Patient was treated for left breast mastitis that started initially on 4/13.  She is currently on Bactrim.  Stated that breast pain has resolved.  She also weaned her baby off.  No new concerns at today's visit.      Review of Systems    Objective   /86 (BP Location: Right arm, Patient Position: Sitting, BP Cuff Size: Large adult)   Pulse 97   Temp 37 °C (98.6 °F) (Temporal)   Resp 18   Ht 1.702 m (5' 7\")   SpO2 99%   BMI 48.40 kg/m²     Physical Exam  General: Awake, alert/oriented x4, well developed, no acute distress  Head: Atraumatic/Normocephalic  Eyes: Normal external exam, EOMI, PERRLA  ENT: Oropharynx normal. Uvula midline, no tonsillar edema, moist mucous membranes  Cardiovascular: RRR, S1/S2, no murmurs, rubs, or gallops, radial pulses +2, no edema of extremities  Pulmonary: CTAB, no respiratory distress. No wheezes, rales, or ronchi  Abdomen: +BS, soft, non-tender, nondistended, no guarding or rebound, no masses noted  MSK: No joint swelling, normal movements of all extremities. Range of motion- normal.  Extremities: FROM, no edema, wounds, or contusions  Skin- No lesions, contusions, or erythema.  Neuro: Alert/oriented x4, no focal motor or sensory deficits  Psychiatric: Judgment intact. Appropriate mood and behavior     Assessment/Plan   Problem List Items Addressed This Visit          Genitourinary and Reproductive    Mastitis     Other Visit Diagnoses         Hospital discharge follow-up    -  Primary    Reviewed hospital stay workup, notes, imaging      Morbid obesity (Multi)          Chronic hypertension with superimposed preeclampsia (HHS-HCC)        Blood pressure slightly up today 142/86.  Continue " nifedipine, reinforced lifestyle modification

## 2025-05-09 ENCOUNTER — PATIENT OUTREACH (OUTPATIENT)
Dept: PRIMARY CARE | Facility: CLINIC | Age: 30
End: 2025-05-09
Payer: COMMERCIAL

## 2025-07-03 ENCOUNTER — TELEMEDICINE (OUTPATIENT)
Dept: URGENT CARE | Age: 30
End: 2025-07-03
Payer: COMMERCIAL

## 2025-07-03 DIAGNOSIS — N61.0 MASTITIS, LEFT, ACUTE: Primary | ICD-10-CM

## 2025-07-03 RX ORDER — CEFADROXIL 500 MG/1
500 CAPSULE ORAL 2 TIMES DAILY
Qty: 20 CAPSULE | Refills: 0 | Status: SHIPPED | OUTPATIENT
Start: 2025-07-03 | End: 2025-07-13

## 2025-07-03 NOTE — PROGRESS NOTES
Urgent Care Virtual Video Visit    Patient Location: Home  Provider Location: [facility]    Patient presents to urgent care via virtual visit for complaints of left breast pain and redness.  Patient has a history of mastitis and states that she is currently breast-feeding.  Reports symptoms started yesterday.    Denies fever or chills    Patient alert and oriented  Patient speaking in full sentences, no audible wheezing, or cough.    Will start patient on Duricef for acute left mastitis.  Patient has an allergy to penicillins.  She can tolerate cephalosporins.  Patient was told if she develops a fever, worsening pain, swelling or redness to go straight to the emergency room.  Follow-up with your PCP or OB/GYN if needed.  Patient verbalizes understanding and comfortable plan of care.    I have communicated my name and active licensure. Video visit completed with realtime synchronous video/audio connection. Informed consent was obtained from the patient. Patient was made aware that my evaluation and diagnosis are limited due to the fact that we are not in the same room during the interview and that this is a virtual encounter that took place via videoconferencing. Patient verbalized understanding.     Patient disposition: Home    Electronically signed by KIMMY Levy  8:35 AM

## 2025-10-22 ENCOUNTER — APPOINTMENT (OUTPATIENT)
Dept: DERMATOLOGY | Facility: CLINIC | Age: 30
End: 2025-10-22
Payer: COMMERCIAL